# Patient Record
Sex: FEMALE | Race: WHITE | Employment: UNEMPLOYED | URBAN - METROPOLITAN AREA
[De-identification: names, ages, dates, MRNs, and addresses within clinical notes are randomized per-mention and may not be internally consistent; named-entity substitution may affect disease eponyms.]

---

## 2017-05-25 ENCOUNTER — DOCUMENTATION ONLY (OUTPATIENT)
Dept: FAMILY MEDICINE CLINIC | Age: 47
End: 2017-05-25

## 2017-05-25 ENCOUNTER — OFFICE VISIT (OUTPATIENT)
Dept: FAMILY MEDICINE CLINIC | Age: 47
End: 2017-05-25

## 2017-05-25 VITALS
SYSTOLIC BLOOD PRESSURE: 142 MMHG | RESPIRATION RATE: 20 BRPM | HEIGHT: 62 IN | BODY MASS INDEX: 43.39 KG/M2 | OXYGEN SATURATION: 96 % | TEMPERATURE: 98.3 F | DIASTOLIC BLOOD PRESSURE: 90 MMHG | HEART RATE: 93 BPM | WEIGHT: 235.8 LBS

## 2017-05-25 DIAGNOSIS — M67.40 GANGLION CYST: ICD-10-CM

## 2017-05-25 DIAGNOSIS — Z72.0 TOBACCO USE: ICD-10-CM

## 2017-05-25 DIAGNOSIS — R03.0 ELEVATED BP WITHOUT DIAGNOSIS OF HYPERTENSION: ICD-10-CM

## 2017-05-25 DIAGNOSIS — L98.9 SKIN LESION: Primary | ICD-10-CM

## 2017-05-25 DIAGNOSIS — R35.1 NOCTURIA: ICD-10-CM

## 2017-05-25 DIAGNOSIS — Z12.39 SCREENING FOR BREAST CANCER: ICD-10-CM

## 2017-05-25 DIAGNOSIS — M54.10 RADICULAR LOW BACK PAIN: ICD-10-CM

## 2017-05-25 DIAGNOSIS — R20.2 PARESTHESIA OF LEFT LEG: ICD-10-CM

## 2017-05-25 NOTE — MR AVS SNAPSHOT
Visit Information Date & Time Provider Department Dept. Phone Encounter #  
 5/25/2017 10:15 AM Danilo Traore MD [facility] [phone] [id number] Upcoming Health Maintenance Date Due DTaP/Tdap/Td series (1 - Tdap) 10/20/1989 PAP AKA CERVICAL CYTOLOGY 10/20/1989 INFLUENZA AGE 9 TO ADULT 8/1/2017 Allergies as of 5/25/2017  Review Complete On: 5/25/2017 By: Aura Raymond LPN Severity Noted Reaction Type Reactions Codeine High 05/25/2017    Anaphylaxis Pcn [Penicillins]  05/25/2017    Rash Current Immunizations  Never Reviewed No immunizations on file. Not reviewed this visit You Were Diagnosed With   
  
 Codes Comments Skin lesion    -  Primary ICD-10-CM: L98.9 ICD-9-CM: 709.9 Tobacco use     ICD-10-CM: Z72.0 ICD-9-CM: 305.1 Elevated BP without diagnosis of hypertension     ICD-10-CM: R03.0 ICD-9-CM: 796.2 Radicular low back pain     ICD-10-CM: M54.10 ICD-9-CM: 724.4 Screening for breast cancer     ICD-10-CM: Z12.39 
ICD-9-CM: V76.10 Paresthesia of left leg     ICD-10-CM: R20.2 ICD-9-CM: 782.0 Nocturia     ICD-10-CM: R35.1 ICD-9-CM: 788.43 Ganglion cyst     ICD-10-CM: M67.40 ICD-9-CM: 727.43 Vitals BP Pulse Temp Resp Height(growth percentile) Weight(growth percentile) 142/90 93 98.3 °F (36.8 °C) 20 5' 2\" (1.575 m) 235 lb 12.8 oz (107 kg) SpO2 BMI OB Status Smoking Status 96% 43.13 kg/m2 Postmenopausal Current Every Day Smoker Vitals History BMI and BSA Data Body Mass Index Body Surface Area  
 43.13 kg/m 2 2.16 m 2 Your Updated Medication List  
  
Notice  As of 5/25/2017 10:41 AM  
 You have not been prescribed any medications. We Performed the Following REFERRAL TO DERMATOLOGY [REF19 Custom] REFERRAL TO HAND SURGERY [REF31 Custom] Comments:  
 Please evaluate patient for ganglion cyst. To-Do List   
 05/25/2017 Lab:  CBC W/O DIFF   
  
 05/25/2017 Lab:  HEMOGLOBIN A1C W/O EAG   
  
 05/25/2017 Lab:  LIPID PANEL   
  
 05/25/2017 Imaging:  CAMPOS MAMMO BI SCREENING INCL CAD   
  
 05/25/2017 Lab:  METABOLIC PANEL, COMPREHENSIVE   
  
 05/25/2017 Neurology:  NCV/LAT SENSORY PER NERVE LW/LT   
  
 05/25/2017 Lab:  TSH 3RD GENERATION   
  
 05/25/2017 Lab:  VITAMIN B12   
  
 05/25/2017 Imaging:  XR SPINE LUMB 2 OR 3 V Referral Information Referral ID Referred By Referred To  
  
 [id number] [facility], Sally Child, [address] [facility], [address] Phone: [phone] Fax: [phone] Visits Status Start Date End Date 1 New Request 5/25/17 5/25/18 If your referral has a status of pending review or denied, additional information will be sent to support the outcome of this decision. Referral ID Referred By Referred To  
 [id number] Anoop Petersen V Not Available Visits Status Start Date End Date 1 New Request 5/25/17 5/25/18 If your referral has a status of pending review or denied, additional information will be sent to support the outcome of this decision. Patient Instructions You have been referred to gynecology. Please call one of the preferred providers listed below and schedule your appointment. Once you have scheduled your appointment, please call the office at [phone] and leave the details of your appointment (provider you will be seeing, appointment date and time) on the voice mail. [facility]2 [facility] Melvin Stark [address], [address] [facility], [address] [facility],   [address] 
phone: [phone] Audrey Jernigan [facility] [address], [address] [facility] [place] [facility], [address] 
[phone] Introducing \[facility]\"" & HEALTH SERVICES!    
 Meghann Stevensonverly introduces thinkingphones patient portal. Now you can access parts of your medical record, email your doctor's office, and request medication refills online. 1. In your internet browser, go to [email or web address]. [employer] 2. Click on the First Time User? Click Here link in the Sign In box. You will see the New Member Sign Up page. 3. Enter your Xiam Access Code exactly as it appears below. You will not need to use this code after youve completed the sign-up process. If you do not sign up before the expiration date, you must request a new code. · Xiam Access Code: [email or web address] Expires: 8/23/2017  9:58 AM 
 
4. Enter the last four digits of your Social Security Number (xxxx) and Date of Birth (mm/dd/yyyy) as indicated and click Submit. You will be taken to the next sign-up page. 5. Create a Xiam ID. This will be your Xiam login ID and cannot be changed, so think of one that is secure and easy to remember. 6. Create a Xiam password. You can change your password at any time. 7. Enter your Password Reset Question and Answer. This can be used at a later time if you forget your password. 8. Enter your e-mail address. You will receive e-mail notification when new information is available in [address] 9. Click Sign Up. You can now view and download portions of your medical record. 10. Click the Download Summary menu link to download a portable copy of your medical information. If you have questions, please visit the Frequently Asked Questions section of the Xiam website. Remember, Xiam is NOT to be used for urgent needs. For medical emergencies, dial 911. Now available from your iPhone and Android! Please provide this summary of care documentation to your next provider. Your primary care clinician is listed as Eliot 13. If you have any questions after today's visit, please call [phone].

## 2017-05-25 NOTE — PROGRESS NOTES
Patient NCV order faxed to [facility] neurology and sleep center     Phone [phone]  Fax [phone]     They will contact patient to make appointment

## 2017-05-25 NOTE — PATIENT INSTRUCTIONS
You have been referred to gynecology. Please call one of the preferred providers listed below and schedule your appointment. Once you have scheduled your appointment, please call the office at [phone] and leave the details of your appointment (provider you will be seeing, appointment date and time) on the voice mail.       Truman Bailey  [address], [address], [address] [facility] [address]  phone: [phone] [facility]  [address], [address] [facility], [address] [place] [address]  [phone]

## 2017-05-25 NOTE — PROGRESS NOTES
Assessment/Plan:    Job Hawk was seen today for establish care. Diagnoses and all orders for this visit:    Skin lesion- referral derm for eval  -     REFERRAL TO DERMATOLOGY    Elevated BP without diagnosis of hypertension- f/u in 1-3 mos. -     CBC W/O DIFF; Future  -     METABOLIC PANEL, COMPREHENSIVE; Future  -     LIPID PANEL; Future    Radicular low back pain with paresthesia of LLE. Ck xray. Suspect central etiology. Ck ncs. -     XR SPINE LUMB 2 OR 3 V; Future  -     NCV/LAT SENSORY PER NERVE LW/LT; Future  -     VITAMIN B12; Future  -     TSH 3RD GENERATION; Future    Screening for breast cancer  -     [facility] MAMMO BI SCREENING INCL CAD; Future    Tobacco use    Nocturia  -     HEMOGLOBIN A1C W/O EAG; Future    Ganglion cyst  -     REFERRAL TO HAND SURGERY        The plan was discussed with the patient. The patient verbalized understanding and is in agreement with the plan. All medication potential side effects were discussed with the patient. Health Maintenance: pap overdue- pt to schedule with gyn  Health Maintenance   Topic Date Due    DTaP/Tdap/Td series (1 - Tdap) 10/20/1989    PAP AKA CERVICAL CYTOLOGY  10/20/1989    INFLUENZA AGE 9 TO ADULT  08/01/2017       Bola Romo is a 48 y.o.  female and presents with No chief complaint on file. Subjective:  Pt is here to establish care. She notes a skin lesion on R breast x 5 years. Has gotten bigger over time. It itches. Her father had skin cancer (she is unsure of the type). Pt c/o bilat low back pain. In 2011, she fell when getting out of bed. She was told her \"platelet levels were off\". She was told it was possibly MS but didn't have further eval.  Pt c/o L leg numbness on anterior and lateral portion of leg. She denies weakness. Walking makes her back pain worse. She states she has difficulty walking and moving her L leg sometimes. Notes a lump on L wrist that is pain.   States it started as tendonitis in her thumb but has moved to her wrist x 1.5mos. Worse with extension of wrist.    ROS:  Constitutional: No recent weight change. No weakness/fatigue. No f/c. Skin: No rashes, change in nails/hair, itching   HENT: No HA, dizziness. No hearing loss/tinnitus. No nasal congestion/discharge. Eyes: No change in vision, double/blurred vision or eye pain/redness. Cardiovascular: No CP/palpitations. No LYLES/orthopnea/PND. Respiratory: No cough/sputum, dyspnea, wheezing. Gastointestinal: No dysphagia, reflux. No n/v. No constipation/diarrhea. No melena/rectal bleeding. Genitourinary: No dysuria, urinary hesitancy, +nocturia, hematuria. No incontinence. Musculoskeletal: No joint pain/stiffness. + muscle pain/tenderness. Endo: No heat/cold intolerance, + polyuria/polydypsia. Heme: No h/o anemia. No easy bleeding/bruising. Allergy/Immunology: No seasonal rhinitis. Denies frequent colds, sinus/ear infections. Neurological: No seizures/+numbness/no weakness. + paresthesias. Psychiatric:  No depression, anxiety. PMH:  Past Medical History:   Diagnosis Date    Anxiety     Depression        PSH:  History reviewed. No pertinent surgical history. SH:  Social History   Substance Use Topics    Smoking status: Current Every Day Smoker    Smokeless tobacco: Never Used    Alcohol use Yes       FH:  Family History   Problem Relation Age of Onset    Diabetes Mother     Diabetes Father     Cancer Father      skin,colon    Stroke Father     Heart Disease Father        Medications/Allergies:  No current outpatient prescriptions on file.   Allergies   Allergen Reactions    Codeine Anaphylaxis    Pcn [Penicillins] Rash       Objective:  Visit Vitals    /90    Pulse 93    Temp 98.3 °F (36.8 °C)    Resp 20    Ht 5' 2\" (1.575 m)    Wt 235 lb 12.8 oz (107 kg)    SpO2 96%    BMI 43.13 kg/m2      Constitutional: Well developed, nourished, no distress, alert, obese habitus   HENT: Exterior ears and tympanic membranes normal bilaterally. Supple neck. No thyromegaly or lymphadenopathy. Oropharynx clear and moist mucous membranes. Eyes: Conjunctiva normal. PERRL. Cardiovascular: S1, S2.  RRR. No murmurs/rubs. No thrills palpated. No carotid bruits. Intact distal pulses. No edema. Pulmonary/Chest Wall: No abnormalities on inspection. Clear to auscultation bilaterally. No wheezing/rhonchi. Normal effort. GI: Soft, nontender, nondistended. Normal active bowel sounds. No  masses on palpation. No hepatosplenomegaly. Musculoskeletal: Gait normal.  Joints without deformity/tenderness.  +ganglion cyst L wrist   Neurological: Appropriate. No focal motor deficits. Speech normal.  Decreased sensation to monofilament to LLE and L back. Skin: 3-4cm large brown rough plaque lesion on R breast    Psych: Appropriate affect, judgement and insight. Short-term memory intact.

## 2017-05-25 NOTE — PROGRESS NOTES
Hugo Perez is a 48 y.o. female here today to establish care. Patient also has complaints of right breast mole. 1. Have you been to the ER, urgent care clinic since your last visit? Hospitalized since your last visit? No    2. Have you seen or consulted any other health care providers outside of the [address] [place] [address] since your last visit? Include any pap smears or colon screening.  No

## 2017-05-26 ENCOUNTER — TELEPHONE (OUTPATIENT)
Dept: FAMILY MEDICINE CLINIC | Age: 47
End: 2017-05-26

## 2017-05-26 NOTE — TELEPHONE ENCOUNTER
No, I ordered a nerve conduction study which will be done at neurology office.   We will contact her with details

## 2017-06-02 ENCOUNTER — TELEPHONE (OUTPATIENT)
Dept: FAMILY MEDICINE CLINIC | Age: 47
End: 2017-06-02

## 2017-06-02 NOTE — TELEPHONE ENCOUNTER
Pt stated that she was told by Hand Surgery specialist that they do not accept her health insurance [employer]. Pt wants to see if Dr. Nai Chacko can refer her somewhere else. Asking to be called back.

## 2017-06-06 ENCOUNTER — HOSPITAL ENCOUNTER (OUTPATIENT)
Dept: LAB | Age: 47
Discharge: HOME | End: 2017-06-06

## 2017-06-06 PROCEDURE — 99001 SPECIMEN HANDLING PT-LAB: CPT | Performed by: INTERNAL MEDICINE

## 2017-06-07 ENCOUNTER — TELEPHONE (OUTPATIENT)
Dept: FAMILY MEDICINE CLINIC | Age: 47
End: 2017-06-07

## 2017-06-07 PROBLEM — R73.03 PREDIABETES: Status: ACTIVE | Noted: 2017-06-07

## 2017-06-07 PROBLEM — E78.2 MIXED HYPERCHOLESTEROLEMIA AND HYPERTRIGLYCERIDEMIA: Status: ACTIVE | Noted: 2017-06-07

## 2017-06-07 PROBLEM — E78.6 LOW HDL (UNDER 40): Status: ACTIVE | Noted: 2017-06-07

## 2017-06-07 LAB
ALBUMIN SERPL-MCNC: 4.4 G/DL (ref 3.5–5.5)
ALBUMIN/GLOB SERPL: 1.9 {RATIO} (ref 1.2–2.2)
ALP SERPL-CCNC: 83 IU/L (ref 39–117)
ALT SERPL-CCNC: 38 IU/L (ref 0–32)
AST SERPL-CCNC: 20 IU/L (ref 0–40)
BILIRUB SERPL-MCNC: 0.2 MG/DL (ref 0–1.2)
BUN SERPL-MCNC: 13 MG/DL (ref 6–24)
BUN/CREAT SERPL: 13 (ref 9–23)
CALCIUM SERPL-MCNC: 9.2 MG/DL (ref 8.7–10.2)
CHLORIDE SERPL-SCNC: 101 MMOL/L (ref 96–106)
CHOLEST SERPL-MCNC: 200 MG/DL (ref 100–199)
CO2 SERPL-SCNC: 21 MMOL/L (ref 18–29)
CREAT SERPL-MCNC: 0.97 MG/DL (ref 0.57–1)
ERYTHROCYTE [DISTWIDTH] IN BLOOD BY AUTOMATED COUNT: 14.9 % (ref 12.3–15.4)
GLOBULIN SER CALC-MCNC: 2.3 G/DL (ref 1.5–4.5)
GLUCOSE SERPL-MCNC: 100 MG/DL (ref 65–99)
HBA1C MFR BLD: 6.3 % (ref 4.8–5.6)
HCT VFR BLD AUTO: 43.8 % (ref 34–46.6)
HDLC SERPL-MCNC: 38 MG/DL
HGB BLD-MCNC: 14.5 G/DL (ref 11.1–15.9)
INTERPRETATION, 910389: NORMAL
LDLC SERPL CALC-MCNC: 103 MG/DL (ref 0–99)
MCH RBC QN AUTO: 30.5 PG (ref 26.6–33)
MCHC RBC AUTO-ENTMCNC: 33.1 G/DL (ref 31.5–35.7)
MCV RBC AUTO: 92 FL (ref 79–97)
PLATELET # BLD AUTO: 267 X10E3/UL (ref 150–379)
POTASSIUM SERPL-SCNC: 5.1 MMOL/L (ref 3.5–5.2)
PROT SERPL-MCNC: 6.7 G/DL (ref 6–8.5)
RBC # BLD AUTO: 4.76 X10E6/UL (ref 3.77–5.28)
SODIUM SERPL-SCNC: 140 MMOL/L (ref 134–144)
TRIGL SERPL-MCNC: 293 MG/DL (ref 0–149)
TSH SERPL DL<=0.005 MIU/L-ACNC: 2.18 UIU/ML (ref 0.45–4.5)
VIT B12 SERPL-MCNC: 310 PG/ML (ref 211–946)
VLDLC SERPL CALC-MCNC: 59 MG/DL (ref 5–40)
WBC # BLD AUTO: 8.6 X10E3/UL (ref 3.4–10.8)

## 2017-06-07 NOTE — TELEPHONE ENCOUNTER
Pt called in to report that she is scheduled for an appointment with dermatology tomorrow. Just wanted to let her PCP know.

## 2017-06-07 NOTE — PROGRESS NOTES
Tell pt her labs showed her triglycerides are high and her good cholesterol is low. In addition, it showed that she is prediabetic. She should cut back on carbs/sweets/soda/juice and increase her exercise (to help the HDL). Let's repeat her labs in 3 months.

## 2017-06-27 ENCOUNTER — TELEPHONE (OUTPATIENT)
Dept: FAMILY MEDICINE CLINIC | Age: 47
End: 2017-06-27

## 2017-06-27 NOTE — TELEPHONE ENCOUNTER
Pt has a cpe scheduled 7/31 and is requesting her bw ordered before her appt.  Please review and order accordingly

## 2017-06-28 DIAGNOSIS — E78.2 MIXED HYPERCHOLESTEROLEMIA AND HYPERTRIGLYCERIDEMIA: Primary | ICD-10-CM

## 2017-06-28 DIAGNOSIS — E78.2 MIXED HYPERCHOLESTEROLEMIA AND HYPERTRIGLYCERIDEMIA: ICD-10-CM

## 2017-06-28 DIAGNOSIS — Z00.00 ROUTINE GENERAL MEDICAL EXAMINATION AT A HEALTH CARE FACILITY: ICD-10-CM

## 2017-06-28 DIAGNOSIS — R73.03 PREDIABETES: ICD-10-CM

## 2017-06-28 DIAGNOSIS — R03.0 ELEVATED BP WITHOUT DIAGNOSIS OF HYPERTENSION: ICD-10-CM

## 2017-08-15 PROBLEM — M79.7 FIBROMYALGIA: Status: ACTIVE | Noted: 2017-08-15

## 2017-10-24 ENCOUNTER — HOSPITAL ENCOUNTER (OUTPATIENT)
Dept: LAB | Age: 47
Discharge: HOME | End: 2017-10-24

## 2017-10-24 ENCOUNTER — OFFICE VISIT (OUTPATIENT)
Dept: FAMILY MEDICINE CLINIC | Age: 47
End: 2017-10-24

## 2017-10-24 VITALS
RESPIRATION RATE: 19 BRPM | SYSTOLIC BLOOD PRESSURE: 140 MMHG | HEART RATE: 69 BPM | DIASTOLIC BLOOD PRESSURE: 84 MMHG | OXYGEN SATURATION: 97 % | TEMPERATURE: 97.7 F | HEIGHT: 62 IN | WEIGHT: 234 LBS | BODY MASS INDEX: 43.06 KG/M2

## 2017-10-24 DIAGNOSIS — I10 ESSENTIAL HYPERTENSION: ICD-10-CM

## 2017-10-24 DIAGNOSIS — F32.1 MODERATE SINGLE CURRENT EPISODE OF MAJOR DEPRESSIVE DISORDER (HCC): ICD-10-CM

## 2017-10-24 DIAGNOSIS — Z72.0 TOBACCO USE: ICD-10-CM

## 2017-10-24 DIAGNOSIS — M79.7 FIBROMYALGIA: ICD-10-CM

## 2017-10-24 DIAGNOSIS — Z00.00 ROUTINE GENERAL MEDICAL EXAMINATION AT A HEALTH CARE FACILITY: Primary | ICD-10-CM

## 2017-10-24 DIAGNOSIS — R73.03 PREDIABETES: ICD-10-CM

## 2017-10-24 DIAGNOSIS — Z23 ENCOUNTER FOR IMMUNIZATION: ICD-10-CM

## 2017-10-24 DIAGNOSIS — E78.2 MIXED HYPERCHOLESTEROLEMIA AND HYPERTRIGLYCERIDEMIA: ICD-10-CM

## 2017-10-24 DIAGNOSIS — M54.10 RADICULAR LOW BACK PAIN: ICD-10-CM

## 2017-10-24 DIAGNOSIS — M67.439 GANGLION OF WRIST, UNSPECIFIED LATERALITY: ICD-10-CM

## 2017-10-24 PROBLEM — R03.0 ELEVATED BP WITHOUT DIAGNOSIS OF HYPERTENSION: Status: RESOLVED | Noted: 2017-05-25 | Resolved: 2017-10-24

## 2017-10-24 PROCEDURE — 99001 SPECIMEN HANDLING PT-LAB: CPT | Performed by: INTERNAL MEDICINE

## 2017-10-24 RX ORDER — GABAPENTIN 300 MG/1
300 CAPSULE ORAL 3 TIMES DAILY
COMMUNITY
Start: 2017-10-20 | End: 2018-01-24

## 2017-10-24 RX ORDER — MELOXICAM 15 MG/1
1 TABLET ORAL DAILY
COMMUNITY
Start: 2017-10-20 | End: 2018-01-24

## 2017-10-24 NOTE — PATIENT INSTRUCTIONS
Stopping Smoking: Care Instructions  Your Care Instructions  Cigarette smokers crave the nicotine in cigarettes. Giving it up is much harder than simply changing a habit. Your body has to stop craving the nicotine. It is hard to quit, but you can do it. There are many tools that people use to quit smoking. You may find that combining tools works best for you. There are several steps to quitting. First you get ready to quit. Then you get support to help you. After that, you learn new skills and behaviors to become a nonsmoker. For many people, a necessary step is getting and using medicine. Your doctor will help you set up the plan that best meets your needs. You may want to attend a smoking cessation program to help you quit smoking. When you choose a program, look for one that has proven success. Ask your doctor for ideas. You will greatly increase your chances of success if you take medicine as well as get counseling or join a cessation program.  Some of the changes you feel when you first quit tobacco are uncomfortable. Your body will miss the nicotine at first, and you may feel short-tempered and grumpy. You may have trouble sleeping or concentrating. Medicine can help you deal with these symptoms. You may struggle with changing your smoking habits and rituals. The last step is the tricky one: Be prepared for the smoking urge to continue for a time. This is a lot to deal with, but keep at it. You will feel better. Follow-up care is a key part of your treatment and safety. Be sure to make and go to all appointments, and call your doctor if you are having problems. Its also a good idea to know your test results and keep a list of the medicines you take. How can you care for yourself at home? · Ask your family, friends, and coworkers for support. You have a better chance of quitting if you have help and support.   · Join a support group, such as Nicotine Anonymous, for people who are trying to quit smoking. · Consider signing up for a smoking cessation program, such as the American Lung Association's Freedom from Smoking program.  · Set a quit date. Pick your date carefully so that it is not right in the middle of a big deadline or stressful time. Once you quit, do not even take a puff. Get rid of all ashtrays and lighters after your last cigarette. Clean your house and your clothes so that they do not smell of smoke. · Learn how to be a nonsmoker. Think about ways you can avoid those things that make you reach for a cigarette. ¨ Avoid situations that put you at greatest risk for smoking. For some people, it is hard to have a drink with friends without smoking. For others, they might skip a coffee break with coworkers who smoke. ¨ Change your daily routine. Take a different route to work or eat a meal in a different place. · Cut down on stress. Calm yourself or release tension by doing an activity you enjoy, such as reading a book, taking a hot bath, or gardening. · Talk to your doctor or pharmacist about nicotine replacement therapy, which replaces the nicotine in your body. You still get nicotine but you do not use tobacco. Nicotine replacement products help you slowly reduce the amount of nicotine you need. These products come in several forms, many of them available over-the-counter:  ¨ Nicotine patches  ¨ Nicotine gum and lozenges  ¨ Nicotine inhaler  · Ask your doctor about bupropion (Wellbutrin) or varenicline (Chantix), which are prescription medicines. They do not contain nicotine. They help you by reducing withdrawal symptoms, such as stress and anxiety. · Some people find hypnosis, acupuncture, and massage helpful for ending the smoking habit. · Eat a healthy diet and get regular exercise. Having healthy habits will help your body move past its craving for nicotine. · Be prepared to keep trying. Most people are not successful the first few times they try to quit.  Do not get mad at yourself if you smoke again. Make a list of things you learned and think about when you want to try again, such as next week, next month, or next year. Where can you learn more? Go to http://andrews-amanda.info/. Enter O024 in the search box to learn more about \"Stopping Smoking: Care Instructions. \"  Current as of: March 20, 2017  Content Version: 11.3  © 4014-7697 [employer]. Care instructions adapted under license by [employer] (which disclaims liability or warranty for this information). If you have questions about a medical condition or this instruction, always ask your healthcare professional. [place] [address] any warranty or liability for your use of this information. Make an appointment with:     [facility]  [address], [address] [place] [address]  [phone]        You have been referred to gynecology. Please call one of the preferred providers listed below and schedule your appointment. Once you have scheduled your appointment, please call the office at [phone] and leave the details of your appointment (provider you will be seeing, appointment date and time) on the voice mail.       Krystle Burkett  [facility] [address], [address] [facility], [address] [place] [address],   [address]  phone: [phone]

## 2017-10-24 NOTE — PROGRESS NOTES
Assessment/Plan:    1. Routine general medical examination at a health care facility    2. Essential hypertension  -pt wishes to work on diet/wt loss. F/u in1 mo    3. Prediabetes  -ck labs    4. Mixed hypercholesterolemia and hypertriglyceridemia  -monitor    5. Tobacco use  -counseled on cessation    6. Ganglion of wrist, unspecified laterality  -Dr. Anthony Correia    7. Fibromyalgia- consider cymbalta to help with sx and depression  - REFERRAL TO PHYSICAL THERAPY    8. Radicular low back pain  -start neurontin as prescribed by Dr. Anthony Correia  - Tae Townsend    9. Depression - declines meds. Pt to go to counseling. Discussed she may need meds to help with sx, given severity of sx.    10. Encounter for immunization  - Influenza virus vaccine (QUADRIVALENT PRES FREE SYRINGE) IM ([id number])  - MO IMMUNIZ ADMIN,1 SINGLE/COMB VAC/TOXOID    The plan was discussed with the patient. The patient verbalized understanding and is in agreement with the plan. All medication potential side effects were discussed with the patient. Health Maintenance:   Health Maintenance   Topic Date Due    Pneumococcal 19-64 Medium Risk (1 of 1 - PPSV23) 10/20/1987    DTaP/Tdap/Td series (1 - Tdap) 10/20/1989    PAP AKA CERVICAL CYTOLOGY  10/20/1989    INFLUENZA AGE 9 TO ADULT  08/01/2017       Ferrell Soulier is a 50 y.o. female and presents with Complete Physical     Subjective:  Here for physical. Had labs drawn this am.   HTN - bp consistently high on past two visits. She doesn't want to start meds. She wishes to watch it for one more month. She has h/o depression, dx 2005. She states she was never treated (she previously self-treated with drugs and ETOH). She feels overwhelmed. PHQ score correlates to mod-severe depression. She doesn't want to take any medications. She was prescribed neurontin by Dr. Anthony Correia to help with fibromyalgia sx. She hasn't started it yet b/c she is concerned about possible side effects. ROS:  Constitutional: No recent weight change. No weakness/fatigue. No f/c. Skin: No rashes, change in nails/hair, itching   HENT: No HA, dizziness. No hearing loss/tinnitus. No nasal congestion/discharge. Eyes: No change in vision, double/blurred vision or eye pain/redness. Cardiovascular: No CP/palpitations. No LYLES/orthopnea/PND. Respiratory: No cough/sputum, dyspnea, wheezing. Gastointestinal: No dysphagia, reflux. No n/v. No constipation/diarrhea. No melena/rectal bleeding. Genitourinary: No dysuria, urinary hesitancy, nocturia, hematuria. No incontinence. Musculoskeletal: + joint pain/stiffness. + muscle pain/tenderness. Endo: No heat/cold intolerance, no polyuria/polydypsia. Heme: No h/o anemia. No easy bleeding/bruising. Allergy/Immunology: No seasonal rhinitis. Denies frequent colds, sinus/ear infections. Neurological: No seizures/numbness/weakness. No paresthesias. Psychiatric:  + depression, no anxiety. The problem list was updated as a part of today's visit. Patient Active Problem List   Diagnosis Code    Elevated BP without diagnosis of hypertension R03.0    Tobacco use Z72.0    Radicular low back pain M54.10    Prediabetes R73.03    Low HDL (under 40) E78.6    Mixed hypercholesterolemia and hypertriglyceridemia E78.2    Fibromyalgia M79.7       The PSH, FH were reviewed. SH:  Social History   Substance Use Topics    Smoking status: Current Every Day Smoker    Smokeless tobacco: Never Used    Alcohol use Yes       Medications/Allergies:    Current Outpatient Prescriptions:     meloxicam (MOBIC) 15 mg tablet, Take 1 Tab by mouth daily. , Disp: , Rfl:     gabapentin (NEURONTIN) 300 mg capsule, Take 300 mg by mouth three (3) times daily. , Disp: , Rfl:        Allergies   Allergen Reactions    Codeine Anaphylaxis    Pcn [Penicillins] Rash       Objective:  Visit Vitals    /84 (BP 1 Location: Left arm, BP Patient Position: Sitting)    Pulse 69  Temp 97.7 °F (36.5 °C) (Oral)    Resp 19    Ht 5' 1.67\" (1.566 m)    Wt 234 lb (106.1 kg)    SpO2 97%    BMI 43.26 kg/m2      Constitutional: Well developed, nourished, no distress, alert, obese habitus   HENT: Exterior ears and tympanic membranes normal bilaterally. Supple neck. No thyromegaly or lymphadenopathy. Oropharynx clear and moist mucous membranes. Eyes: Conjunctiva normal. PERRL. CV: S1, S2.  RRR. No murmurs/rubs. No thrills palpated. No carotid bruits. Intact distal pulses. No edema. Pulm: No abnormalities on inspection. Clear to auscultation bilaterally. No wheezing/rhonchi. Normal effort. GI: Soft, nontender, nondistended. Normal active bowel sounds. MS: Gait normal.  Joints without deformity/tenderness. Strength intact bilateral upper and lower ext. Normal ROM all extremities. Neuro: A/O x 3. No focal motor or sensory deficits. Speech normal.   Skin: No lesions/rashes on inspection. Psych: Appropriate affect, judgement and insight. Short-term memory intact.        PHQ over the last two weeks 10/24/2017   PHQ Not Done -   Little interest or pleasure in doing things More than half the days   Feeling down, depressed or hopeless More than half the days   Total Score PHQ 2 4   Trouble falling or staying asleep, or sleeping too much Nearly every day   Feeling tired or having little energy Nearly every day   Poor appetite or overeating Nearly every day   Feeling bad about yourself - or that you are a failure or have let yourself or your family down Not at all   Trouble concentrating on things such as school, work, reading or watching TV Nearly every day   Moving or speaking so slowly that other people could have noticed; or the opposite being so fidgety that others notice Not at all   Thoughts of being better off dead, or hurting yourself in some way Not at all   PHQ 9 Score 16

## 2017-10-24 NOTE — PROGRESS NOTES
Julia Padilla is a 50 y.o. female (: 1970) presenting to address:    Chief Complaint   Patient presents with    Complete Physical       Vitals:    10/24/17 0810   BP: 140/84   Pulse: 69   Resp: 19   Temp: 97.7 °F (36.5 °C)   TempSrc: Oral   SpO2: 97%   Weight: 234 lb (106.1 kg)   Height: 5' 1.67\" (1.566 m)   PainSc:   7   PainLoc: Back       Hearing/Vision:      Visual Acuity Screening    Right eye Left eye Both eyes   Without correction: 20/20 20/40 20/20   With correction:          Learning Assessment:   No flowsheet data found. Depression Screening:     PHQ over the last two weeks 2017   PHQ Not Done Active Diagnosis of Depression or Bipolar Disorder     Fall Risk Assessment:   No flowsheet data found. Abuse Screening:   No flowsheet data found. Coordination of Care Questionaire:   1. Have you been to the ER, urgent care clinic since your last visit? Hospitalized since your last visit? No     2. Have you seen or consulted any other health care providers outside of the [address] [place] [address] since your last visit? Include any pap smears or colon screening. Dr Shay rod for hand, Dr. Murray Owens neuro 2017 Dr. Fabian James     Advanced Directive:   1. Do you have an Advanced Directive? No     2. Would you like information on Advanced Directives?  Yes       Needs PHQ9 completed

## 2017-10-24 NOTE — PROGRESS NOTES
Flu shot Immunization/s administered 10/24/2017 by Yeimy Shay LPN with guardian's consent. Patient tolerated procedure well. No reactions noted.

## 2017-10-25 LAB
ALBUMIN SERPL-MCNC: 4.2 G/DL (ref 3.5–5.5)
ALBUMIN/GLOB SERPL: 1.6 {RATIO} (ref 1.2–2.2)
ALP SERPL-CCNC: 83 IU/L (ref 39–117)
ALT SERPL-CCNC: 41 IU/L (ref 0–32)
AST SERPL-CCNC: 16 IU/L (ref 0–40)
BILIRUB SERPL-MCNC: <0.2 MG/DL (ref 0–1.2)
BUN SERPL-MCNC: 13 MG/DL (ref 6–24)
BUN/CREAT SERPL: 11 (ref 9–23)
CALCIUM SERPL-MCNC: 9.1 MG/DL (ref 8.7–10.2)
CHLORIDE SERPL-SCNC: 99 MMOL/L (ref 96–106)
CHOLEST SERPL-MCNC: 199 MG/DL (ref 100–199)
CO2 SERPL-SCNC: 22 MMOL/L (ref 18–29)
CREAT SERPL-MCNC: 1.14 MG/DL (ref 0.57–1)
ERYTHROCYTE [DISTWIDTH] IN BLOOD BY AUTOMATED COUNT: 14.6 % (ref 12.3–15.4)
GFR SERPLBLD CREATININE-BSD FMLA CKD-EPI: 57 ML/MIN/1.73
GFR SERPLBLD CREATININE-BSD FMLA CKD-EPI: 65 ML/MIN/1.73
GLOBULIN SER CALC-MCNC: 2.6 G/DL (ref 1.5–4.5)
GLUCOSE SERPL-MCNC: 87 MG/DL (ref 65–99)
HBA1C MFR BLD: 6.1 % (ref 4.8–5.6)
HCT VFR BLD AUTO: 42.9 % (ref 34–46.6)
HDLC SERPL-MCNC: 37 MG/DL
HGB BLD-MCNC: 14.3 G/DL (ref 11.1–15.9)
INTERPRETATION, 910389: NORMAL
INTERPRETATION: NORMAL
LDLC SERPL CALC-MCNC: 114 MG/DL (ref 0–99)
MCH RBC QN AUTO: 30.7 PG (ref 26.6–33)
MCHC RBC AUTO-ENTMCNC: 33.3 G/DL (ref 31.5–35.7)
MCV RBC AUTO: 92 FL (ref 79–97)
PDF IMAGE, 910387: NORMAL
PLATELET # BLD AUTO: 283 X10E3/UL (ref 150–379)
POTASSIUM SERPL-SCNC: 3.9 MMOL/L (ref 3.5–5.2)
PROT SERPL-MCNC: 6.8 G/DL (ref 6–8.5)
RBC # BLD AUTO: 4.66 X10E6/UL (ref 3.77–5.28)
SODIUM SERPL-SCNC: 139 MMOL/L (ref 134–144)
TRIGL SERPL-MCNC: 239 MG/DL (ref 0–149)
VLDLC SERPL CALC-MCNC: 48 MG/DL (ref 5–40)
WBC # BLD AUTO: 10.8 X10E3/UL (ref 3.4–10.8)

## 2017-10-27 ENCOUNTER — TELEPHONE (OUTPATIENT)
Dept: FAMILY MEDICINE CLINIC | Age: 47
End: 2017-10-27

## 2017-10-27 NOTE — TELEPHONE ENCOUNTER
Patient called and l/m to let Dr. Jose D Lerner know that she has appt.  With Dr. Forest Mireles 11/6/17 @1 pm

## 2017-11-01 ENCOUNTER — HOSPITAL ENCOUNTER (OUTPATIENT)
Dept: PHYSICAL THERAPY | Age: 47
Discharge: HOME | End: 2017-11-01
Payer: MEDICAID

## 2017-11-01 PROCEDURE — 97162 PT EVAL MOD COMPLEX 30 MIN: CPT

## 2017-11-01 PROCEDURE — 97110 THERAPEUTIC EXERCISES: CPT

## 2017-11-01 NOTE — PROGRESS NOTES
Shaylee Duncan 31  [facility]   [place] [address], [place] [address],[facility], [address] - Phone: [phone]  Fax: [phone] / [phone] [facility]  Patient Name: Sahra Leon : 1970   Medical   Diagnosis: Low back pain [M54.5]  Fibromyalgia [M79.7] Treatment Diagnosis: Low back pain [M54.5]  Fibromyalgia [M79.7]   Onset Date: Chronic ~      Referral Source: Sully Roberson MD [facility]): 2017   Prior Hospitalization: See medical history Provider #: [id number]   Prior Level of Function: Chronic occasional limitations to household chores, lifting, carrying and ADL's    Comorbidities: HTN, ? M.S., morbid obesity (BMI 42.8), fibromyalgia, asthma, depression   Medications: Verified on Patient Summary List   The Plan of Care and following information is based on the information from the initial evaluation.   ===========================================================================================  Assessment / key information:  Pt is a 50y.o. year old female with subjective complaints of LBP and left leg pain of chronic nature. Pt provides subjective history including: Pt states she fell in  and went to Urgent care and was told to f/u with neurology for possible MS, however pt has not pursued. 2017 dx by orthosurgeon for fibromyalgia. Pt went to neurologist 2017; per pt EMG testing completed and positive for impingement L5-S1 and referred for outpt PT and MRI, however she chose not to pursue either at that time. She recently f/u with PCP and was again referred for outpt PT to address LBP and fibromyalgia dx. Current pain is rated as 3 to 10/10; reports constant left leg numbness/burning/tingling from posterior back/buttocks, wrapping around anterior thigh and terminating at knee level.   Current functional limitations: standing tolerance ~ 5 minutes, walking tolerance ~5 minutes, prolonged sitting ~ 20 minutes, unable to sleep through the night. FOTO score= 47/100 indicating moderate impairment to functional activities. Today's evaulation is significant for Lumbar AROM grossly WNL with pain to left SB. Impaired core/ hip strength: glutes= 3/5, Glute Med= 3+/5, Hams (L) 4-, Quads (L) 4, Hip flex (L) 4+. Impaired flexibility to bilateral psoas and lumbo-sacral paraspinals. Positive special testing for (L) MILTON. (-) SLR, SLUMP, Gaenslen's. Pt with significant hypertonicity to bilateral lumbo-sacral paraspinals with TTP. Repeated movement testing reveals no directional preference. Pt will be a good candidate for skilled PT to address these impairments and promote return to normal ADLs and functional mobility for improved quality of life.    ===========================================================================================  Eval Complexity: History HIGH Complexity :3+ comorbidities / personal factors will impact the outcome/ POC ;  Examination  MEDIUM Complexity : 3 Standardized tests and measures addressing body structure, function, activity limitation and / or participation in recreation ; Presentation MEDIUM Complexity : Evolving with changing characteristics ;   Decision Making MEDIUM Complexity : FOTO score of 26-74; Overall Complexity MEDIUM  Problem List: pain affecting function, decrease strength, impaired gait/ balance, decrease ADL/ functional abilitiies, decrease activity tolerance, decrease flexibility/ joint mobility and decrease transfer abilities   Treatment Plan may include any combination of the following: Therapeutic exercise, Therapeutic activities, Neuromuscular re-education, Physical agent/modality, Gait/balance training, Manual therapy, Patient education and Functional mobility training  Patient / Family readiness to learn indicated by: asking questions and trying to perform skills  Persons(s) to be included in education: patient (P)  Barriers to Learning/Limitations: None  Measures taken:    Patient Goal (s): \"less pain, more mobility\"   Patient self reported health status: poor  Rehabilitation Potential: good   Short Term Goals: To be accomplished in  2  weeks:  1. Pt will be educated in appropriate HEP to improve LE strength/stability for amb, standing, and transfers. 2. Patient will report at least 50% functional improvement with standing, walking ADL's.  3. Pt will report at least 50% improvement in frequency/intensity of left LE radicular symptoms with ADL's.  Long Term Goals: To be accomplished in  4-6  weeks:  1. Pt will improve FOTO score to >/= 57 to demo a significant improvement in functional activity tolerance. 2. Pt will achieve >/= +4 GROC in order to promote increased activity tolerance. 3. Pt will note pain less than or equal to 3/10 at worst to allow increase in functional abilities. 4. Pt will increase bilateral glute strength to >/= 4-/5 to improve axial stability with standing/walking. Frequency / Duration:   Patient to be seen  2  times per week for 4-6  weeks:  Patient / Caregiver education and instruction: activity modification and exercises  G-Codes (GP): n/a  Therapist Signature: Chandana Ibarra PT Date: 71/5/0535   Certification Period: n/a Time: 12:52 PM   ===========================================================================================  I certify that the above Physical Therapy Services are being furnished while the patient is under my care. I agree with the treatment plan and certify that this therapy is necessary. Physician Signature:        Date:       Time:     Please sign and return to [facility] at [facility], [facility]. or you may fax the signed copy to [phone]. Thank you.

## 2017-11-01 NOTE — PROGRESS NOTES
PHYSICAL THERAPY - DAILY TREATMENT NOTE    Patient Name: Ravinder Winters        Date: 2017  : 1970   yes Patient  Verified  Visit #:   1     Insurance: Payor: Lalo Kay / Plan: Valri Pulling / Product Type: HMO /      In time: 102 Out time: 157   Total Treatment Time: 55     Medicare Time Tracking (below)   Total Timed Codes (min):  n/a 1:1 Treatment Time:  n/a     TREATMENT AREA =  Low back pain [M54.5]  Fibromyalgia [M79.7]    SUBJECTIVE  Pain Level (on 0 to 10 scale):  7-8  / 10   Medication Changes/New allergies or changes in medical history, any new surgeries or procedures?    no  If yes, update Summary List   Subjective Functional Status/Changes:  []  No changes reported     See POC          OBJECTIVE      See exam on chart for details on objective findings         10 min Therapeutic Exercise:  [x]  See flow sheet and pt ed below   Rationale:      increase ROM and increase strength to improve the patients ability to perform functional mobility/ADLs and attain goals. min Patient Education:  yes  Review HEP, handout created and issued to pt. as per chart. Pt educated in spinal anatomy, function and dysfunction as related to diagnosis. Instructed in proper body mechanics for supine to/from sit and correct execution of engaging core. Pt ed on activity modification to avoid over-exertion   []  Progressed/Changed HEP based on: Other Objective/Functional Measures:    See POC     Post Treatment Pain Level (on 0 to 10) scale:   7-8   10     ASSESSMENT  Assessment/Changes in Function:     See POC     []  See Progress Note/Recertification   Patient will continue to benefit from skilled PT services to modify and progress therapeutic interventions, address functional mobility deficits, address ROM deficits, address strength deficits, analyze and address soft tissue restrictions and analyze and cue movement patterns to attain remaining goals.    Progress toward goals / Updated goals:    See POC     PLAN  []  Upgrade activities as tolerated yes Continue plan of care   []  Discharge due to :    []  Other:      Therapist: Dante Vivas. Hans Corral PT    Date: 11/1/2017 Time: 12:51 PM     Future Appointments  Date Time Provider Jessica Reyez   11/1/2017 1:00 PM [facility]   11/6/2017 1:00 PM DO VICK Faye [facility]   11/28/2017 1:45 PM Zack Christina MD [facility]

## 2017-11-06 ENCOUNTER — OFFICE VISIT (OUTPATIENT)
Dept: OBGYN CLINIC | Age: 47
End: 2017-11-06

## 2017-11-06 VITALS
HEART RATE: 88 BPM | SYSTOLIC BLOOD PRESSURE: 143 MMHG | DIASTOLIC BLOOD PRESSURE: 82 MMHG | BODY MASS INDEX: 42.88 KG/M2 | HEIGHT: 62 IN | WEIGHT: 233 LBS

## 2017-11-06 DIAGNOSIS — Z01.419 WELL WOMAN EXAM WITH ROUTINE GYNECOLOGICAL EXAM: Primary | ICD-10-CM

## 2017-11-06 DIAGNOSIS — Z01.419 WELL WOMAN EXAM WITH ROUTINE GYNECOLOGICAL EXAM: ICD-10-CM

## 2017-11-06 DIAGNOSIS — R35.0 FREQUENT URINATION: ICD-10-CM

## 2017-11-06 DIAGNOSIS — R31.29 OTHER MICROSCOPIC HEMATURIA: ICD-10-CM

## 2017-11-06 LAB
BILIRUB UR QL STRIP: NEGATIVE
GLUCOSE UR-MCNC: NEGATIVE MG/DL
KETONES P FAST UR STRIP-MCNC: NEGATIVE MG/DL
PH UR STRIP: 5.5 [PH] (ref 4.6–8)
PROT UR QL STRIP: NORMAL
SP GR UR STRIP: 1.02 (ref 1–1.03)
UA UROBILINOGEN AMB POC: NORMAL (ref 0.2–1)
URINALYSIS CLARITY POC: CLEAR
URINALYSIS COLOR POC: YELLOW
URINE BLOOD POC: NORMAL
URINE LEUKOCYTES POC: NEGATIVE
URINE NITRITES POC: NEGATIVE

## 2017-11-06 NOTE — MR AVS SNAPSHOT
Visit Information Date & Time Provider Department Dept. Phone Encounter #  
 11/6/2017  1:00 PM Marylene Golds, Khadar E Ruthie Omalley OBGYN [phone] [id number] Your Appointments 11/28/2017  1:45 PM  
Follow Up with Hugh Craig MD  
3 [facility]) Appt Note: 1 month f/u  
 [address] [facility] [address] [facility] [id number] [phone] [address] [facility] [address] [facility] Upcoming Health Maintenance Date Due Pneumococcal 19-64 Medium Risk (1 of 1 - PPSV23) 10/20/1987 DTaP/Tdap/Td series (1 - Tdap) 10/20/1989 PAP AKA CERVICAL CYTOLOGY 10/20/1989 Allergies as of 11/6/2017  Review Complete On: 11/6/2017 By: Marylene Golds, DO Severity Noted Reaction Type Reactions Codeine High 05/25/2017    Anaphylaxis Pcn [Penicillins]  05/25/2017    Rash Current Immunizations  Never Reviewed Name Date Influenza Vaccine (Quad) PF 10/24/2017  8:44 AM  
  
 Not reviewed this visit You Were Diagnosed With   
  
 Codes Comments Well woman exam with routine gynecological exam    -  Primary ICD-10-CM: T42.228 ICD-9-CM: V72.31 Vitals BP Pulse Height(growth percentile) Weight(growth percentile) BMI OB Status 143/82 88 5' 2\" (1.575 m) 233 lb (105.7 kg) 42.62 kg/m2 Postmenopausal  
 Smoking Status Current Every Day Smoker Vitals History BMI and BSA Data Body Mass Index Body Surface Area  
 42.62 kg/m 2 2.15 m 2 Preferred Pharmacy Pharmacy Name Phone [phone] Your Updated Medication List  
  
   
This list is accurate as of: 11/6/17  1:54 PM.  Always use your most recent med list.  
  
  
  
  
 gabapentin 300 mg capsule Commonly known as:  NEURONTIN Take 300 mg by mouth three (3) times daily. meloxicam 15 mg tablet Commonly known as:  MOBIC Take 1 Tab by mouth daily.   
  
  
  
  
To-Do List   
 11/06/2017 Imaging:  [facility] BI SCREENING INCL CAD   
  
 11/06/2017 Pathology:  PAP IG, CT-NG TV HPV 16&18,45 ([id number], [id number]) 11/08/2017 11:00 AM  
  Appointment with Michelle Galeano, PTA at [address] ([phone])  
  
 11/10/2017 10:00 AM  
  Appointment with Alejandra OLIVEROS [address] at [facility] ([phone])  
  
 11/15/2017 12:00 PM  
  Appointment with Michelle Galeano, PTA at [address] [place] [address] ([phone])  
  
 11/17/2017 11:30 AM  
  Appointment with Michelle Galeano, PTA at [address] [place] [address] ([phone])  
  
 11/20/2017 11:30 AM  
  Appointment with Michelle Galeano, PTA at [address] [place] [address] ([phone])  
  
 11/29/2017 12:00 PM  
  Appointment with Michelle Galeano, PTA at [address] ([phone]) Introducing [facility]! [facility] introduces [employer] patient portal. Now you can access parts of your medical record, email your doctor's office, and request medication refills online. 1. In your internet browser, go to [email or web address] 2. Click on the First Time User? Click Here link in the Sign In box. You will see the New Member Sign Up page. 3. Enter your [employer] Access Code exactly as it appears below. You will not need to use this code after youve completed the sign-up process. If you do not sign up before the expiration date, you must request a new code. · [employer] Access Code: [email or web address] Expires: 1/22/2018  7:34 AM 
 
4. Enter the last four digits of your Social Security Number (xxxx) and Date of Birth (mm/dd/yyyy) as indicated and click Submit. You will be taken to the next sign-up page. 5. Create a [employer] ID. This will be your [employer] login ID and cannot be changed, so think of one that is secure and easy to remember. 6. Create a [employer] password. You can change your password at any time. 7. Enter your Password Reset Question and Answer.  This can be used at a later time if you forget your password. 8. Enter your e-mail address. You will receive e-mail notification when new information is available in [address] 9. Click Sign Up. You can now view and download portions of your medical record. 10. Click the Download Summary menu link to download a portable copy of your medical information. If you have questions, please visit the Frequently Asked Questions section of the Where website. Remember, Where is NOT to be used for urgent needs. For medical emergencies, dial 911. Now available from your iPhone and Android! Please provide this summary of care documentation to your next provider. Your primary care clinician is listed as Eliot 13. If you have any questions after today's visit, please call [phone].

## 2017-11-08 ENCOUNTER — APPOINTMENT (OUTPATIENT)
Dept: PHYSICAL THERAPY | Age: 47
End: 2017-11-08
Payer: MEDICAID

## 2017-11-08 LAB — BACTERIA UR CULT: NORMAL

## 2017-11-10 ENCOUNTER — APPOINTMENT (OUTPATIENT)
Dept: PHYSICAL THERAPY | Age: 47
End: 2017-11-10
Payer: MEDICAID

## 2017-11-11 LAB
C TRACH RRNA CVX QL NAA+PROBE: NEGATIVE
CYTOLOGIST CVX/VAG CYTO: NORMAL
CYTOLOGY CVX/VAG DOC THIN PREP: NORMAL
DX ICD CODE: NORMAL
HPV I/H RISK 1 DNA CVX QL PROBE+SIG AMP: NEGATIVE
Lab: NORMAL
N GONORRHOEA RRNA CVX QL NAA+PROBE: NEGATIVE
OTHER STN SPEC: NORMAL
PATH REPORT.FINAL DX SPEC: NORMAL
STAT OF ADQ CVX/VAG CYTO-IMP: NORMAL
T VAGINALIS RRNA SPEC QL NAA+PROBE: NEGATIVE

## 2017-11-15 ENCOUNTER — HOSPITAL ENCOUNTER (OUTPATIENT)
Dept: PHYSICAL THERAPY | Age: 47
Discharge: HOME | End: 2017-11-15
Payer: MEDICAID

## 2017-11-15 PROCEDURE — 97110 THERAPEUTIC EXERCISES: CPT

## 2017-11-15 PROCEDURE — 97140 MANUAL THERAPY 1/> REGIONS: CPT

## 2017-11-15 NOTE — PROGRESS NOTES
PHYSICAL THERAPY - DAILY TREATMENT NOTE    Patient Name: Sanjay Machuca        Date: 11/15/2017  : 1970   YES Patient  Verified  Visit #:   2   of   12  Insurance: Payor: BLUE CROSS MEDICAID / Plan: [address] [place] [address] / Product Type: Managed Care Medicaid /      In time: 12 Out time: 1240   Total Treatment Time: 40     Medicare Time Tracking (below)   Total Timed Codes (min):  40 1:1 Treatment Time:       TREATMENT AREA =  Low back pain [M54.5]    SUBJECTIVE  Pain Level (on 0 to 10 scale):  5  / 10   Medication Changes/New allergies or changes in medical history, any new surgeries or procedures? NO    If yes, update Summary List   Subjective Functional Status/Changes:  []  No changes reported     \"Pain is always in my back, sometimes in the (L) leg. The leg always has burning and numbness. \"           OBJECTIVE  25 min Therapeutic Exercise:  [x]  See flow sheet   Rationale:      increase ROM, increase strength and improve coordination to improve the patients ability to perform pain free ADLs. 15 Min Manual Therapy: STM/DTM and TPR (L) lumbar paraspinals, QL, glute/piriformis, glute med. Man lumbar distraction. Rationale:      decrease pain, increase ROM, increase tissue extensibility and decrease trigger points to improve patient's ability to perform pain free ADLs. min Patient Education:  YES  Reviewed HEP   []  Progressed/Changed HEP based on: Other Objective/Functional Measures: Added multiple exercises to improve core strength and stability for ADLs. See flow sheet. Post Treatment Pain Level (on 0 to 10) scale:   5  / 10     ASSESSMENT  Assessment/Changes in Function:     Pt reporting no change in symptoms after session. However did report relief w/ SB rollout, ta draws, and lumbar distraction.        []  See Progress Note/Recertification   Patient will continue to benefit from skilled PT services to modify and progress therapeutic interventions, address functional mobility deficits, address ROM deficits, address strength deficits, analyze and address soft tissue restrictions, analyze and cue movement patterns, analyze and modify body mechanics/ergonomics and assess and modify postural abnormalities to attain remaining goals. Progress toward goals / Updated goals:    Initiated therex.       PLAN  [x]  Upgrade activities as tolerated YES Continue plan of care   []  Discharge due to :    []  Other:      Therapist: Ed Michael PTA    Date: 11/15/2017 Time: 12:20 PM     Future Appointments  Date Time Provider Jessica Reyez   11/17/2017 11:30 AM Ed Oms, PTA [facility]   11/20/2017 11:30 AM Ed Oms, PTA [facility]   11/28/2017 1:45 PM MD BRANDO Arcos [facility]   11/29/2017 12:00 PM Ed Oms, [facility]

## 2017-11-17 ENCOUNTER — HOSPITAL ENCOUNTER (OUTPATIENT)
Dept: PHYSICAL THERAPY | Age: 47
Discharge: HOME | End: 2017-11-17
Payer: MEDICAID

## 2017-11-17 PROCEDURE — 97140 MANUAL THERAPY 1/> REGIONS: CPT

## 2017-11-17 PROCEDURE — 97110 THERAPEUTIC EXERCISES: CPT

## 2017-11-17 NOTE — PROGRESS NOTES
PHYSICAL THERAPY - DAILY TREATMENT NOTE    Patient Name: Tommy Mc        Date: 2017  : 1970   YES Patient  Verified  Visit #:   3   of   12  Insurance: Payor: BLUE CROSS MEDICAID / Plan: [facility] [employer] / Product Type: Managed Care Medicaid /      In time: 81 Out time:    Total Treatment Time: 45     Medicare Time Tracking (below)   Total Timed Codes (min):  35 1:1 Treatment Time:       TREATMENT AREA =  Low back pain [M54.5]    SUBJECTIVE  Pain Level (on 0 to 10 scale):  7  / 10   Medication Changes/New allergies or changes in medical history, any new surgeries or procedures? NO    If yes, update Summary List   Subjective Functional Status/Changes:  []  No changes reported     \"I wasn't very sore after the last appointment. \"  Reports compliance with HEP. OBJECTIVE  Modalities Rationale:     decrease pain and increase tissue extensibility to improve patient's ability to perform pain free ADLs. min [] Estim, type/location:                                      []  att     []  unatt     []  w/US     []  w/ice    []  w/heat    min []  Mechanical Traction: type/lbs                   []  pro   []  sup   []  int   []  cont    []  before manual    []  after manual    min []  Ultrasound, settings/location:      min []  Iontophoresis w/ dexamethasone, location:                                               []  take home patch       []  in clinic   10 min []  Ice     [x]  Heat    location/position: Prone, lumbar    min []  Vasopneumatic Device, press/temp:     min []  Other:    [x] Skin assessment post-treatment (if applicable):    [x]  intact    []  redness- no adverse reaction     []redness  adverse reaction:        27 min Therapeutic Exercise:  [x]  See flow sheet   Rationale:      increase ROM, increase strength and improve coordination to improve the patients ability to perform pain free ADLs. 8 Min Manual Therapy: Manual LS distraction in prone. Rationale:      decrease pain, increase ROM and increase tissue extensibility to improve patient's ability to perform pain free ADLs. min Patient Education:  YES  Reviewed HEP   []  Progressed/Changed HEP based on: Other Objective/Functional Measures: Added t-band resistance w/ sidelying clams, added deadbugs to improve core strength and stability for ADLs. See flow sheet. Post Treatment Pain Level (on 0 to 10) scale:   7  / 10     ASSESSMENT  Assessment/Changes in Function:     Pt reporting relief following MH, but no decrease in pain. Continued positive response to manual lumbar distraction. []  See Progress Note/Recertification   Patient will continue to benefit from skilled PT services to modify and progress therapeutic interventions, address functional mobility deficits, address ROM deficits, address strength deficits, analyze and address soft tissue restrictions, analyze and cue movement patterns, analyze and modify body mechanics/ergonomics and assess and modify postural abnormalities to attain remaining goals. Progress toward goals / Updated goals:    Minimal change in progress toward LTG's with today's session.       PLAN  [x]  Upgrade activities as tolerated YES Continue plan of care   []  Discharge due to :    []  Other:      Therapist: Isi Delarosa PTA    Date: 11/17/2017 Time: 11:46 AM     Future Appointments  Date Time Provider Jessica Reyez   11/20/2017 11:30 AM Isi Delarosa PTA [facility]   11/28/2017 1:45 PM Dimitry Yun MD [facility]   11/29/2017 12:00 PM Isi Delarosa PTA [facility]

## 2017-11-20 ENCOUNTER — HOSPITAL ENCOUNTER (OUTPATIENT)
Dept: PHYSICAL THERAPY | Age: 47
Discharge: HOME | End: 2017-11-20
Payer: MEDICAID

## 2017-11-20 PROCEDURE — 97110 THERAPEUTIC EXERCISES: CPT

## 2017-11-20 PROCEDURE — 97140 MANUAL THERAPY 1/> REGIONS: CPT

## 2017-11-20 NOTE — PROGRESS NOTES
PHYSICAL THERAPY - DAILY TREATMENT NOTE    Patient Name: Rachelle Lombardi        Date: 2017  : 1970   YES Patient  Verified  Visit #:     Insurance: Payor: Michelle Shaw / Plan: [address] [place] [address] / Product Type: Managed Care Medicaid /      In time:  Out time:    Total Treatment Time: 35     Medicare Time Tracking (below)   Total Timed Codes (min):  25 1:1 Treatment Time:       TREATMENT AREA =  Low back pain [M54.5]    SUBJECTIVE  Pain Level (on 0 to 10 scale):  8  / 10   Medication Changes/New allergies or changes in medical history, any new surgeries or procedures? NO    If yes, update Summary List   Subjective Functional Status/Changes:  []  No changes reported     \"I'm really sore today, can we just do the bike and some heat? \"  Pt reports she was moving furniture yesterday, and her low back was spasming w/ pain down her (R) leg today. OBJECTIVE  Modalities Rationale:     decrease pain and increase tissue extensibility to improve patient's ability to perform pain free ADLs. min [] Estim, type/location:                                      []  att     []  unatt     []  w/US     []  w/ice    []  w/heat    min []  Mechanical Traction: type/lbs                   []  pro   []  sup   []  int   []  cont    []  before manual    []  after manual    min []  Ultrasound, settings/location:      min []  Iontophoresis w/ dexamethasone, location:                                               []  take home patch       []  in clinic   10 min []  Ice     [x]  Heat    location/position: Prone, lumbar.      min []  Vasopneumatic Device, press/temp:     min []  Other:    [x] Skin assessment post-treatment (if applicable):    [x]  intact    []  redness- no adverse reaction     []redness  adverse reaction:        17 min Therapeutic Exercise:  [x]  See flow sheet   Rationale:      increase ROM, increase strength and improve coordination to improve the patients ability to perform pain free ADLs. 8 min Manual Therapy: Lumbar distraction in prone. Rationale:      decrease pain, increase ROM, increase tissue extensibility and decrease trigger points to improve patient's ability to perform pain free ADLs. min Patient Education:  YES  Reviewed HEP   []  Progressed/Changed HEP based on: Other Objective/Functional Measures: Therex per flow sheet. Held multiple exercises due to c/o pain. Focus on stretch, pain relief. Updated HEP, see chart. Post Treatment Pain Level (on 0 to 10) scale:   5  / 10     ASSESSMENT  Assessment/Changes in Function:     Good pain response to session. []  See Progress Note/Recertification   Patient will continue to benefit from skilled PT services to modify and progress therapeutic interventions, address functional mobility deficits, address ROM deficits, address strength deficits, analyze and address soft tissue restrictions, analyze and cue movement patterns, analyze and modify body mechanics/ergonomics and assess and modify postural abnormalities to attain remaining goals. Progress toward goals / Updated goals:    No change in progress toward LTG's with today's session.       PLAN  [x]  Upgrade activities as tolerated YES Continue plan of care   []  Discharge due to :    []  Other:      Therapist: Carolina Gomes PTA    Date: 11/20/2017 Time: 11:35 AM     Future Appointments  Date Time Provider Jessica Reyez   11/28/2017 1:45 PM Wilmer Islas MD [facility]   11/29/2017 12:00 PM Carolina Gomes PTA [facility]

## 2017-11-28 ENCOUNTER — OFFICE VISIT (OUTPATIENT)
Dept: FAMILY MEDICINE CLINIC | Age: 47
End: 2017-11-28

## 2017-11-28 VITALS
HEIGHT: 62 IN | HEART RATE: 63 BPM | TEMPERATURE: 98.1 F | SYSTOLIC BLOOD PRESSURE: 124 MMHG | DIASTOLIC BLOOD PRESSURE: 88 MMHG | BODY MASS INDEX: 43.61 KG/M2 | OXYGEN SATURATION: 98 % | RESPIRATION RATE: 18 BRPM | WEIGHT: 237 LBS

## 2017-11-28 DIAGNOSIS — R03.0 ELEVATED BLOOD PRESSURE READING: ICD-10-CM

## 2017-11-28 DIAGNOSIS — R31.9 HEMATURIA, UNSPECIFIED TYPE: Primary | ICD-10-CM

## 2017-11-28 PROBLEM — I10 ESSENTIAL HYPERTENSION: Status: RESOLVED | Noted: 2017-10-24 | Resolved: 2017-11-28

## 2017-11-28 PROBLEM — E66.01 OBESITY, MORBID (HCC): Status: ACTIVE | Noted: 2017-11-28

## 2017-11-28 LAB
BILIRUB UR QL STRIP: NEGATIVE
GLUCOSE UR-MCNC: NEGATIVE MG/DL
KETONES P FAST UR STRIP-MCNC: NEGATIVE MG/DL
PH UR STRIP: 5.5 [PH] (ref 4.6–8)
PROT UR QL STRIP: NEGATIVE
SP GR UR STRIP: 1.02 (ref 1–1.03)
UA UROBILINOGEN AMB POC: NORMAL (ref 0.2–1)
URINALYSIS CLARITY POC: CLEAR
URINALYSIS COLOR POC: YELLOW
URINE BLOOD POC: NORMAL
URINE LEUKOCYTES POC: NEGATIVE
URINE NITRITES POC: NEGATIVE

## 2017-11-28 RX ORDER — QUETIAPINE FUMARATE 50 MG/1
50 TABLET, FILM COATED ORAL 2 TIMES DAILY
COMMUNITY
End: 2018-01-24

## 2017-11-28 RX ORDER — ALBUTEROL SULFATE 90 UG/1
1 AEROSOL, METERED RESPIRATORY (INHALATION)
Qty: 1 INHALER | Refills: 0 | Status: SHIPPED | OUTPATIENT
Start: 2017-11-28 | End: 2018-01-24

## 2017-11-28 RX ORDER — ALBUTEROL SULFATE 90 UG/1
1 AEROSOL, METERED RESPIRATORY (INHALATION)
Qty: 1 INHALER | Refills: 0 | Status: SHIPPED | OUTPATIENT
Start: 2017-11-28 | End: 2017-11-28 | Stop reason: SDUPTHER

## 2017-11-28 RX ORDER — SERTRALINE HYDROCHLORIDE 50 MG/1
TABLET, FILM COATED ORAL DAILY
COMMUNITY
End: 2018-01-24

## 2017-11-28 NOTE — PATIENT INSTRUCTIONS
You have been referred to urology. Please call one of the preferred providers listed below and schedule your appointment. Once you have scheduled your appointment, please call the office at [phone] and leave the details of your appointment (provider you will be seeing, appointment date and time) on the voice mail.     [facility]  Dr. Desire Berry  [phone] [facility],  [facility]

## 2017-11-28 NOTE — PROGRESS NOTES
Flori Tracy is a 50 y.o. female (: 1970) presenting to address:    Chief Complaint   Patient presents with    Hypertension    Cholesterol Problem    Medication Refill           Depression Screening:     PHQ over the last two weeks 10/24/2017   PHQ Not Done -   Little interest or pleasure in doing things More than half the days   Feeling down, depressed or hopeless More than half the days   Total Score PHQ 2 4   Trouble falling or staying asleep, or sleeping too much Nearly every day   Feeling tired or having little energy Nearly every day   Poor appetite or overeating Nearly every day   Feeling bad about yourself - or that you are a failure or have let yourself or your family down Not at all   Trouble concentrating on things such as school, work, reading or watching TV Nearly every day   Moving or speaking so slowly that other people could have noticed; or the opposite being so fidgety that others notice Not at all   Thoughts of being better off dead, or hurting yourself in some way Not at all   PHQ 9 Score 16     Fall Risk Assessment:     Fall Risk Assessment, last 12 mths 2017   Able to walk? Yes   Fall in past 12 months? Yes   Fall with injury? No   Number of falls in past 12 months 1   Fall Risk Score 1     Abuse Screening:     Abuse Screening Questionnaire 10/24/2017   Do you ever feel afraid of your partner? N   Are you in a relationship with someone who physically or mentally threatens you? N   Is it safe for you to go home? Y     Coordination of Care Questionaire:   1. Have you been to the ER, urgent care clinic since your last visit? Hospitalized since your last visit? NO    2. Have you seen or consulted any other health care providers outside of the [address] [place] [address] since your last visit? Include any pap smears or colon screening. NO    Advanced Directive:   1. Do you have an Advanced Directive? NO    2. Would you like information on Advanced Directives?  YES

## 2017-11-28 NOTE — MR AVS SNAPSHOT
Visit Information Date & Time Provider Department Dept. Phone Encounter #  
 11/28/2017  1:45 PM Jaycob Sotelo, 3 [facility] [phone] Follow-up Instructions Return in about 6 months (around 5/28/2018). Upcoming Health Maintenance Date Due Pneumococcal 19-64 Medium Risk (1 of 1 - PPSV23) 10/20/1987 DTaP/Tdap/Td series (1 - Tdap) 10/20/1989 PAP AKA CERVICAL CYTOLOGY 11/6/2020 Allergies as of 11/28/2017  Review Complete On: 11/28/2017 By: Jaycob Sotelo MD  
  
 Severity Noted Reaction Type Reactions Codeine High 05/25/2017    Anaphylaxis Pcn [Penicillins]  05/25/2017    Rash Current Immunizations  Never Reviewed Name Date Influenza Vaccine (Quad) PF 10/24/2017  8:44 AM  
  
 Not reviewed this visit You Were Diagnosed With   
  
 Codes Comments Hematuria, unspecified type    -  Primary ICD-10-CM: R31.9 ICD-9-CM: 599.70 Elevated blood pressure reading     ICD-10-CM: R03.0 ICD-9-CM: 796.2 Vitals BP Pulse Temp Resp Height(growth percentile) Weight(growth percentile) 124/88 (BP 1 Location: Left arm, BP Patient Position: Sitting) 63 98.1 °F (36.7 °C) (Oral) 18 5' 2\" (1.575 m) 237 lb (107.5 kg) SpO2 BMI OB Status Smoking Status 98% 43.35 kg/m2 Postmenopausal Current Every Day Smoker Vitals History BMI and BSA Data Body Mass Index Body Surface Area  
 43.35 kg/m 2 2.17 m 2 Preferred Pharmacy Pharmacy Name Phone [phone] Your Updated Medication List  
  
   
This list is accurate as of: 11/28/17  2:12 PM.  Always use your most recent med list.  
  
  
  
  
 albuterol 90 mcg/actuation inhaler Commonly known as:  PROVENTIL HFA, VENTOLIN HFA, PROAIR HFA Take 1 Puff by inhalation every four (4) hours as needed for Wheezing. gabapentin 300 mg capsule Commonly known as:  NEURONTIN Take 300 mg by mouth three (3) times daily. meloxicam 15 mg tablet Commonly known as:  MOBIC Take 1 Tab by mouth daily. SEROquel 50 mg tablet Generic drug:  QUEtiapine Take 50 mg by mouth two (2) times a day. ZOLOFT 50 mg tablet Generic drug:  sertraline Take  by mouth daily. Prescriptions Printed Refills  
 albuterol (PROVENTIL HFA, VENTOLIN HFA, PROAIR HFA) 90 mcg/actuation inhaler 0 Sig: Take 1 Puff by inhalation every four (4) hours as needed for Wheezing. Class: Print Route: Inhalation We Performed the Following AMB POC URINALYSIS DIP STICK AUTO W/O MICRO [82207 CPT(R)] Follow-up Instructions Return in about 6 months (around 5/28/2018). To-Do List   
 11/29/2017 12:00 PM  
  Appointment with Teresa Ramon PTA at [address] ([phone]) Patient Instructions You have been referred to urology. Please call one of the preferred providers listed below and schedule your appointment. Once you have scheduled your appointment, please call the office at [phone] and leave the details of your appointment (provider you will be seeing, appointment date and time) on the voice mail. [facility] Dr. Mayra Pantoja [address] [facility],  [address] [place] [address] & [facility] SERVICES! Dear Aleida Lorenzo: 
Thank you for requesting a [employer] account. Our records indicate that you already have an active [employer] account. You can access your account anytime at [email or web address] Did you know that you can access your hospital and ER discharge instructions at any time in [employer]? You can also review all of your test results from your hospital stay or ER visit. Additional Information If you have questions, please visit the Frequently Asked Questions section of the [employer] website at [email or web address] Remember, [employer] is NOT to be used for urgent needs. For medical emergencies, dial 911. Now available from your iPhone and Android! Please provide this summary of care documentation to your next provider. Your primary care clinician is listed as Eliot Graves. If you have any questions after today's visit, please call [phone].

## 2017-11-28 NOTE — PROGRESS NOTES
Heidi Sullivan is a 50 y.o. female (: 1970) presenting to address:    Chief Complaint   Patient presents with    Hypertension    Cholesterol Problem    Medication Refill       Vitals:    17 1344   BP: 124/88   Pulse: 63   Resp: 18   Temp: 98.1 °F (36.7 °C)   TempSrc: Oral   SpO2: 98%   Weight: 237 lb (107.5 kg)   Height: 5' 2\" (1.575 m)   PainSc:   6   PainLoc: Generalized       Depression Screening:     PHQ over the last two weeks 10/24/2017   PHQ Not Done -   Little interest or pleasure in doing things More than half the days   Feeling down, depressed or hopeless More than half the days   Total Score PHQ 2 4   Trouble falling or staying asleep, or sleeping too much Nearly every day   Feeling tired or having little energy Nearly every day   Poor appetite or overeating Nearly every day   Feeling bad about yourself - or that you are a failure or have let yourself or your family down Not at all   Trouble concentrating on things such as school, work, reading or watching TV Nearly every day   Moving or speaking so slowly that other people could have noticed; or the opposite being so fidgety that others notice Not at all   Thoughts of being better off dead, or hurting yourself in some way Not at all   PHQ 9 Score 16     Fall Risk Assessment:     Fall Risk Assessment, last 12 mths 2017   Able to walk? Yes   Fall in past 12 months? Yes   Fall with injury? No   Number of falls in past 12 months 1   Fall Risk Score 1     Abuse Screening:     Abuse Screening Questionnaire 10/24/2017   Do you ever feel afraid of your partner? N   Are you in a relationship with someone who physically or mentally threatens you? N   Is it safe for you to go home? Y     Coordination of Care Questionaire:   1. Have you been to the ER, urgent care clinic since your last visit? Hospitalized since your last visit? NO    2.  Have you seen or consulted any other health care providers outside of the [address] [place] [address] since your last visit? Include any pap smears or colon screening. NO    Advanced Directive:   1. Do you have an Advanced Directive? NO    2. Would you like information on Advanced Directives?  YES

## 2017-11-28 NOTE — PROGRESS NOTES
Assessment/Plan:    1. Hematuria, unspecified type  - has referral to urology pending.   - AMB POC URINALYSIS DIP STICK AUTO W/O MICRO    2. Elevated blood pressure reading  -dash diet    The plan was discussed with the patient. The patient verbalized understanding and is in agreement with the plan. All medication potential side effects were discussed with the patient. Health Maintenance:   Health Maintenance   Topic Date Due    Pneumococcal 19-64 Medium Risk (1 of 1 - PPSV23) 10/20/1987    DTaP/Tdap/Td series (1 - Tdap) 10/20/1989    PAP AKA CERVICAL CYTOLOGY  11/06/2020    Influenza Age 5 to Adult  Completed       Kp Galvan is a 50 y.o. female and presents with Hypertension; Cholesterol Problem; and Medication Refill     Subjective:    HTN - bp better today  Previous readings (last month) were high. Pt is requesting refill of ventolin. She was given it for \"bronchitis\". She uses it only when she gets sick. She is a smoker. Was noted to have hematuria by gyn. They recommended she see urology. ROS:  Constitutional: No recent weight change. No weakness/fatigue. No f/c. Cardiovascular: No CP/palpitations. No LYLES/orthopnea/PND. Respiratory: No cough/sputum, dyspnea, wheezing. Gastointestinal: No dysphagia, reflux. No n/v. No constipation/diarrhea. No melena/rectal bleeding. Genitourinary: No dysuria, urinary hesitancy, nocturia, hematuria. No incontinence. The problem list was updated as a part of today's visit. Patient Active Problem List   Diagnosis Code    Tobacco use Z72.0    Radicular low back pain M54.10    Prediabetes R73.03    Low HDL (under 40) E78.6    Mixed hypercholesterolemia and hypertriglyceridemia E78.2    Fibromyalgia M79.7    Ganglion of wrist M67.439    Essential hypertension I10    Moderate single current episode of major depressive disorder ([address] F32.1    Obesity, morbid ([address] E66.01       The PSH, FH were reviewed.     SH:  Social History Substance Use Topics    Smoking status: Current Every Day Smoker    Smokeless tobacco: Never Used    Alcohol use Yes       Medications/Allergies:  Current Outpatient Prescriptions on File Prior to Visit   Medication Sig Dispense Refill    meloxicam (MOBIC) 15 mg tablet Take 1 Tab by mouth daily.  gabapentin (NEURONTIN) 300 mg capsule Take 300 mg by mouth three (3) times daily. No current facility-administered medications on file prior to visit. Allergies   Allergen Reactions    Codeine Anaphylaxis    Pcn [Penicillins] Rash       Objective:  Visit Vitals    /88 (BP 1 Location: Left arm, BP Patient Position: Sitting)    Pulse 63    Temp 98.1 °F (36.7 °C) (Oral)    Resp 18    Ht 5' 2\" (1.575 m)    Wt 237 lb (107.5 kg)    SpO2 98%    BMI 43.35 kg/m2      Constitutional: Well developed, nourished, no distress, alert, obese habitus   CV: S1, S2.  RRR. No murmurs/rubs. No thrills palpated. No carotid bruits. Intact distal pulses. No edema. Pulm: No abnormalities on inspection. Clear to auscultation bilaterally. No wheezing/rhonchi. Normal effort. Psych: Appropriate affect, judgement and insight. Short-term memory intact.        Labwork and Ancillary Studies:    CBC w/Diff  Lab Results   Component Value Date/Time    WBC 10.8 10/24/2017 07:35 AM    HGB 14.3 10/24/2017 07:35 AM    PLATELET 211 46/09/4444 07:35 AM         Basic Metabolic Profile/LFTs  Lab Results   Component Value Date/Time    Sodium 139 10/24/2017 07:35 AM    Potassium 3.9 10/24/2017 07:35 AM    Chloride 99 10/24/2017 07:35 AM    CO2 22 10/24/2017 07:35 AM    Glucose 87 10/24/2017 07:35 AM    BUN 13 10/24/2017 07:35 AM    Creatinine 1.14 10/24/2017 07:35 AM    BUN/Creatinine ratio 11 10/24/2017 07:35 AM    GFR est AA 65 10/24/2017 07:35 AM    GFR est non-AA 57 10/24/2017 07:35 AM    Calcium 9.1 10/24/2017 07:35 AM      Lab Results   Component Value Date/Time    ALT (SGPT) 41 10/24/2017 07:35 AM    AST (SGOT) 16 10/24/2017 07:35 AM    Alk.  phosphatase 83 10/24/2017 07:35 AM    Bilirubin, total <0.2 10/24/2017 07:35 AM       Cholesterol  Lab Results   Component Value Date/Time    Cholesterol, total 199 10/24/2017 07:35 AM    HDL Cholesterol 37 10/24/2017 07:35 AM    LDL, calculated 114 10/24/2017 07:35 AM    Triglyceride 239 10/24/2017 07:35 AM

## 2017-11-29 ENCOUNTER — HOSPITAL ENCOUNTER (OUTPATIENT)
Dept: PHYSICAL THERAPY | Age: 47
Discharge: HOME | End: 2017-11-29
Payer: MEDICAID

## 2017-11-29 PROCEDURE — 97110 THERAPEUTIC EXERCISES: CPT

## 2017-11-29 NOTE — PROGRESS NOTES
PHYSICAL THERAPY - DAILY TREATMENT NOTE    Patient Name: Osbaldo Strickland        Date: 2017  : 1970   YES Patient  Verified  Visit #:      of   12  Insurance: Payor: [employer] / Plan: [address] [place] [address] / Product Type: Managed Care Medicaid /      In time:  Out time:    Total Treatment Time: 35     Medicare Time Tracking (below)   Total Timed Codes (min):  35 1:1 Treatment Time:       TREATMENT AREA =  Low back pain [M54.5]    SUBJECTIVE  Pain Level (on 0 to 10 scale):  4  / 10   Medication Changes/New allergies or changes in medical history, any new surgeries or procedures? NO    If yes, update Summary List   Subjective Functional Status/Changes:  []  No changes reported     \"My pain has gotten a lot better. \"       OBJECTIVE  35 min Therapeutic Exercise:  [x]  See flow sheet   Rationale:      increase ROM, increase strength and improve coordination to improve the patients ability to perform pain free ADLs. min Patient Education:  YES  Reviewed HEP   []  Progressed/Changed HEP based on: Other Objective/Functional Measures: Therex per flow sheet. Post Treatment Pain Level (on 0 to 10) scale:   2-3  / 10     ASSESSMENT  Assessment/Changes in Function:     No exacerbation of symptoms with today's session. []  See Progress Note/Recertification   Patient will continue to benefit from skilled PT services to modify and progress therapeutic interventions, address functional mobility deficits, address ROM deficits, address strength deficits, analyze and address soft tissue restrictions, analyze and cue movement patterns, analyze and modify body mechanics/ergonomics and assess and modify postural abnormalities to attain remaining goals. Progress toward goals / Updated goals:    No change in progress toward LTG's with today's session.       PLAN  [x]  Upgrade activities as tolerated YES Continue plan of care   []  Discharge due to :    []  Other: Therapist: Ed MEERA Rodriguez    Date: 11/29/2017 Time: 12:32 PM     Future Appointments  Date Time Provider Jessica Reyez   12/7/2017 11:30 AM Ed MEERA Rodrigeuz [facility]   1/5/2018 11:30 AM Ed MEERA Rodriguez [facility]

## 2017-12-07 ENCOUNTER — HOSPITAL ENCOUNTER (OUTPATIENT)
Dept: PHYSICAL THERAPY | Age: 47
Discharge: HOME | End: 2017-12-07
Payer: MEDICAID

## 2017-12-07 PROCEDURE — 97110 THERAPEUTIC EXERCISES: CPT

## 2017-12-07 NOTE — PROGRESS NOTES
PHYSICAL THERAPY - DAILY TREATMENT NOTE    Patient Name: Juan Pepper        Date: 2017  : 1970   YES Patient  Verified  Visit #:     Insurance: Payor: Ai Robertson / Plan: [address] [place] [address] / Product Type: Managed Care Medicaid /      In time:  Out time: 12   Total Treatment Time: 30     Medicare Time Tracking (below)   Total Timed Codes (min):  30 1:1 Treatment Time:  30     TREATMENT AREA =  Low back pain [M54.5]    SUBJECTIVE  Pain Level (on 0 to 10 scale):  5-6  / 10   Medication Changes/New allergies or changes in medical history, any new surgeries or procedures? NO    If yes, update Summary List   Subjective Functional Status/Changes:  []  No changes reported     \"I feel better with the movement, but my pain is not going away. \"          OBJECTIVE    30 min Therapeutic Exercise:  [x]  See flow sheet   Rationale:      increase ROM, increase strength and improve coordination to improve the patients ability to perform pain free ADLs. min Patient Education:  YES  Reviewed HEP   []  Progressed/Changed HEP based on: Other Objective/Functional Measures:    Lumbar AROM:   Flex/ext/(B) SB WNL   (R) rot 50% limited  (L) rotation 75% limited    Hip strength:   Ext 5/5 (B)   Abd 5/5 (R), 4+/5 (L). FOTO = 50  GROC = +5     Post Treatment Pain Level (on 0 to 10) scale:   5  / 10     ASSESSMENT  Assessment/Changes in Function:     See PN     []  See Progress Note/Recertification   Patient will continue to benefit from skilled PT services to modify and progress therapeutic interventions, address functional mobility deficits, address ROM deficits, address strength deficits, analyze and address soft tissue restrictions, analyze and cue movement patterns, analyze and modify body mechanics/ergonomics and assess and modify postural abnormalities to attain remaining goals.    Progress toward goals / Updated goals:    See PN     PLAN  [x]  Upgrade activities as tolerated YES Continue plan of care   []  Discharge due to :    []  Other:      Therapist: Hai Zhu PTA    Date: 12/7/2017 Time: 11:37 AM     Future Appointments  Date Time Provider Jessica Reyez   1/5/2018 11:30 AM Hai Zhu PTA [facility]

## 2017-12-07 NOTE — PROGRESS NOTES
[facility] PHYSICAL THERAPY  [address] [place] [address] [address],[facility], [facility] - Phone: [phone]  Fax: [phone]  PROGRESS NOTE  Patient Name: Garret Mohamud : 1970   Treatment/Medical Diagnosis: Low back pain [M54.5]   Referral Source: Reji Jordan MD     Date of Initial Visit: 2017 Attended Visits: 6 Missed Visits: 0     SUMMARY OF TREATMENT  PT has consisted of initial evaluation, therapeutic exercises, HEP, manual therapy, patient education, and modalities. CURRENT STATUS  Pt presented to [facility] w/ c/o LBP and (L) leg pain. Pt reporting improvement in mobility since beginning PT, however minimal change in pain levels. Lumbar AROM WNL flex/ext and (B) SB, 50% limited with (R) rot, 75% limited with (L) rot. Hip strength = 5/5 (B) ext, 5/5 (R) abd, 4+/5 (L) abd. See below for current FOTO/GROC. Pt to be out of town for up to 4 weeks, plans to resume 2x weekly upon return. (I) and compliant with current HEP. Goal/Measure of Progress Goal Met? 1. Pt will improve FOTO score to >/= 57 to demo a significant improvement in functional activity tolerance. Status at last Eval: 47 Current Status: 50 progressing   2. Pt will achieve >/= +4 GROC in order to promote increased activity tolerance. Status at last Eval: Na  Current Status: +5  yes   3. Pt will note pain less than or equal to 3/10 at worst to allow increase in functional abilities. Status at last Eval: 10/10 max  Current Status: 7/10 progressing     4. Pt will increase bilateral glute strength to >/= 4-/5 to improve axial stability with standing/walking. Status at last Eval: (B) glute 3/5  Current Status: Ext 5/5  Abd 5/5 (R), 4+/5 (L) yes       New Goals to be achieved in __4__  weeks:  1. Pt will improve FOTO score to >/= 57 to demo a significant improvement in functional activity tolerance.   2. Pt will achieve >/= +6 GROC in order to promote increased activity tolerance. 3. Pt will note pain less than or equal to 3/10 at worst to allow increase in functional abilities. 4. Pt will be (I) and compliant with long-term HEP in preparation for DC to HEP. RECOMMENDATIONS  Pt to continue 2-3x weekly for up to an additional 4 weeks following return to PT to further decrease pain and improve pain-free lumbar ROM. Thank you for this referral.   If you have any questions/comments please contact us directly at [phone]. Thank you for allowing us to assist in the care of your patient. LPTA Signature: Karla Davison PTA  Date: 12/7/2017   PT Signature: OMAIRA Calderón Time: 12:28 PM   NOTE TO PHYSICIAN:  PLEASE COMPLETE THE ORDERS BELOW AND FAX TO   [facility] [place] [facility]: [phone]  If you are unable to process this request in 24 hours please contact our office: [phone]    ___ I have read the above report and request that my patient continue as recommended.   ___ I have read the above report and request that my patient continue therapy with the following changes/special instructions:_________________________________________________________   ___ I have read the above report and request that my patient be discharged from therapy.      Physician Signature:        Date:       Time:

## 2018-01-05 ENCOUNTER — APPOINTMENT (OUTPATIENT)
Dept: PHYSICAL THERAPY | Age: 48
End: 2018-01-05
Payer: MEDICAID

## 2018-01-11 ENCOUNTER — HOSPITAL ENCOUNTER (OUTPATIENT)
Dept: PHYSICAL THERAPY | Age: 48
Discharge: HOME | End: 2018-01-11
Payer: MEDICAID

## 2018-01-11 PROCEDURE — 97110 THERAPEUTIC EXERCISES: CPT

## 2018-01-11 PROCEDURE — 97140 MANUAL THERAPY 1/> REGIONS: CPT

## 2018-01-11 NOTE — PROGRESS NOTES
Shaylee Duncan 29  [facility]  [address] [facility], [address],[facility], [address] - Phone: [phone]  Fax: [phone]  PROGRESS NOTE  Patient Name: Lorene Dickey : 1970   Treatment/Medical Diagnosis: Low back pain [M54.5]   Referral Source: Loretta Trevino MD     Date of Initial Visit: 17 Attended Visits: 7 Missed Visits: 0     SUMMARY OF TREATMENT  PT has consisted of initial evaluation, therapeutic exercises, HEP, manual therapy, patient education, and modalities. CURRENT STATUS   Pt rates overall improvement as 40% with functional activities since [facility]. \"I was feeling really good with the therapy until I had to leave for vacation\". Pt reports acute exacerbation of central LBP which she attributes to lifting heavy suitcase (~ 50 lbs) over the holidays. States LLE numbness has been constant and unchanged \"for years\". Current improvements: Pt reports improved ROM and tolerance for household chores, lifting, carrying. Current objective impairments:  Average pain= 8/10, least pain= 6/10. Max pain= 10/10. AROM lumbar: flex/ROT WNL with end rand pain into R ROT. Ext= 50%, SB b/l= 75%. MMT:  Hip Ext (R) 5, (L) 4+;  Abd (R) 5, (L) 4. Current functional limitations:  Unable to sleep through the night, limited household chores, bending, standing, lifting. Goal/Measure of Progress Goal Met? 1. Pt will improve FOTO score to >/= 57 to demo a significant improvement in functional activity tolerance. Status at last Eval: 50 on 17 Current Status: 28 no   2. Pt will achieve >/= +6 GROC in order to promote increased activity tolerance. Status at last Eval: +5 \"a good deal better\" on 17 Current Status: -6 \"a great deal worse\" no   3. Pt will note pain less than or equal to 3/10 at worst to allow increase in functional abilities. Status at last Eval: 10 Current Status: 10 no     New Goals to be achieved in __4__  weeks:   All unmet goals above and:  4. Pt will be (I) and compliant with long-term HEP in preparation for DC to HEP. RECOMMENDATIONS  Pt to continue 2-3x weekly for up to an additional 4 weeks following return to PT to further decrease pain and improve pain-free lumbar ROM. Thank you for this referral.     If you have any questions/comments please contact us directly at [phone]. Thank you for allowing us to assist in the care of your patient. Therapist Signature: Ian Lpoez. Fred PT Date: 1/11/2018     Time: 8:39 AM   NOTE TO PHYSICIAN:  PLEASE COMPLETE THE ORDERS BELOW AND FAX TO   [facility]: [phone]  If you are unable to process this request in 24 hours please contact our office: [phone]    ___ I have read the above report and request that my patient continue as recommended.   ___ I have read the above report and request that my patient continue therapy with the following changes/special instructions:_________________________________________________________   ___ I have read the above report and request that my patient be discharged from therapy.      Physician Signature:        Date:       Time:

## 2018-01-11 NOTE — PROGRESS NOTES
PHYSICAL THERAPY - DAILY TREATMENT NOTE    Patient Name: Lavern Conklin        Date: 2018  : 1970   YES Patient  Verified  Visit #:     Insurance: Payor: BLUE CROSS MEDICAID / Plan: [facility] HEALTHKEEPERS PLUS / Product Type: Managed Care Medicaid /      In time: 491 Out time: 916   Total Treatment Time: 46     Medicare Time Tracking (below)   Total Timed Codes (min):  40 1:1 Treatment Time:  n/a     TREATMENT AREA =  Low back pain [M54.5]    SUBJECTIVE  Pain Level (on 0 to 10 scale):  8  / 10   Medication Changes/New allergies or changes in medical history, any new surgeries or procedures? NO    If yes, update Summary List   Subjective Functional Status/Changes:  []  No changes reported     \"I was out of town for a month and carrying heavy suitcases. I woke up this morning and I have a lot of severe pain down the center of my back. I've been doing the HEP and it helps a little bit, but not when I'm hurting so bad\". OBJECTIVE  Modality rationale: decrease pain to improve the patients ability to perform functional mobility/ADLs and attain goals.    Min Type Additional Details   10 [x] Estim:  [x]Unatt       [x]IFC  []Premod                        []Other:  [x]w/ice   []w/heat  Position: supine with LE wedge  Location: L/S    [] Estim: []Att    []TENS instruct  []NMES                    []Other:  []w/US   []w/ice   []w/heat  Position:  Location:    []  Traction: [] Cervical       []Lumbar                       [] Prone          []Supine                       []Intermittent   []Continuous Lbs:  [] before manual  [] after manual    []  Ultrasound: []Continuous   [] Pulsed                           []1MHz   []3MHz Location:  W/cm2:    []  Iontophoresis with dexamethasone         Location: [] Take home patch   [] In clinic    []  Ice     []  heat  []  Ice massage  []  Laser   []  Anodyne Position:  Location:    []  Laser with stim  []  Other: Position:  Location:    [] Vasopneumatic Device Pressure:       [] lo [] med [] hi   Temperature: [] lo [] med [] hi   [x] Skin assessment post-treatment:  [x]intact []redness- no adverse reaction    []redness  adverse reaction:       30 min Therapeutic Exercise:  [x]  See flow sheet   Rationale:      increase ROM, increase strength and improve coordination to improve the patients ability to perform pain free ADLs. min Patient Education:  YES  Reviewed HEP   []  Progressed/Changed HEP based on: Other Objective/Functional Measures:    See PN     Post Treatment Pain Level (on 0 to 10) scale:   7.5  / 10     ASSESSMENT  Assessment/Changes in Function:     Limited exercise tolerance due to pain c/o. Poor tolerance for manual therapy due to hypertonicity and muscle spasms to b/l thoraco-lumbar paraspinals. Repeated movement testing and prone lying provide no centralization for sx's and actually cause pt to report increased LBP. Will require skilled progression to attain goals. []  See Progress Note/Recertification   Patient will continue to benefit from skilled PT services to modify and progress therapeutic interventions, address functional mobility deficits, address ROM deficits, address strength deficits, analyze and address soft tissue restrictions, analyze and cue movement patterns, analyze and modify body mechanics/ergonomics and assess and modify postural abnormalities to attain remaining goals. Progress toward goals / Updated goals:    See PN     PLAN  [x]  Upgrade activities as tolerated YES Continue plan of care   []  Discharge due to :    []  Other:      Therapist: Perri Tan. Tejal Joseph, PT    Date: 1/11/2018 Time: 11:37 AM     Future Appointments  Date Time Provider Jessica Reyez   1/11/2018 8:30 AM Nikki Martinez [facility]   1/24/2018 2:30 PM Julieth Casey MD [address] [facility]

## 2018-01-17 ENCOUNTER — HOSPITAL ENCOUNTER (OUTPATIENT)
Dept: PHYSICAL THERAPY | Age: 48
Discharge: HOME | End: 2018-01-17
Payer: MEDICAID

## 2018-01-17 PROCEDURE — 97110 THERAPEUTIC EXERCISES: CPT

## 2018-01-17 PROCEDURE — 97014 ELECTRIC STIMULATION THERAPY: CPT

## 2018-01-17 NOTE — PROGRESS NOTES
PHYSICAL THERAPY - DAILY TREATMENT NOTE    Patient Name: Jasmyne Katz        Date: 2018  : 1970   YES Patient  Verified  Visit #:   8   of   12  Insurance: Payor: BLUE CROSS MEDICAID / Plan: [facility] [employer] HEALTHKEEPERS PLUS / Product Type: Managed Care Medicaid /      In time: 3 Out time: 350   Total Treatment Time: 50     Medicare Time Tracking (below)   Total Timed Codes (min):  50 1:1 Treatment Time:       TREATMENT AREA =  Low back pain [M54.5]    SUBJECTIVE  Pain Level (on 0 to 10 scale):  5  / 10   Medication Changes/New allergies or changes in medical history, any new surgeries or procedures? NO    If yes, update Summary List   Subjective Functional Status/Changes:  []  No changes reported     Pain always down (L) leg, feels more like numbness/burning/tingling. OBJECTIVE  Modalities Rationale:     decrease inflammation and decrease pain to improve patient's ability to perform pain free ADLs. 10 min [x] Estim, type/location: Lumbar IFC                                    []  att     [x]  unatt     []  w/US     [x]  w/ice    []  w/heat    min []  Mechanical Traction: type/lbs                   []  pro   []  sup   []  int   []  cont    []  before manual    []  after manual    min []  Ultrasound, settings/location:      min []  Iontophoresis w/ dexamethasone, location:                                               []  take home patch       []  in clinic    min []  Ice     []  Heat    location/position:     min []  Vasopneumatic Device, press/temp:     min []  Other:    [x] Skin assessment post-treatment (if applicable):    [x]  intact    []  redness- no adverse reaction     []redness  adverse reaction:        40 min Therapeutic Exercise:  [x]  See flow sheet   Rationale:      increase ROM, increase strength and improve coordination to improve the patients ability to perform pain free ADLs.        min Patient Education:  YES  Reviewed HEP   []  Progressed/Changed HEP based on: Other Objective/Functional Measures: Therex per flow sheet. Post Treatment Pain Level (on 0 to 10) scale:   5  / 10     ASSESSMENT  Assessment/Changes in Function:     Increase in lumbar pain w/ VLADISLAV @ wall, no increase in radicular symptoms. []  See Progress Note/Recertification   Patient will continue to benefit from skilled PT services to modify and progress therapeutic interventions, address functional mobility deficits, address ROM deficits, address strength deficits, analyze and address soft tissue restrictions, analyze and cue movement patterns, analyze and modify body mechanics/ergonomics and assess and modify postural abnormalities to attain remaining goals. Progress toward goals / Updated goals:    Reduction in reported pain from previous session. Progressing toward LTG #3. PLAN  [x]  Upgrade activities as tolerated YES Continue plan of care   []  Discharge due to :    []  Other:      Therapist: Tawny Henry PTA    Date: 1/17/2018 Time: 3:09 PM     Future Appointments  Date Time Provider Jessica Reyez   1/19/2018 2:30 PM Tawny Henry PTA [facility]   1/23/2018 3:00 PM Tawny Henry PTA [facility]   1/24/2018 2:30 PM Carmelo Wolf MD [address] [facility]   1/25/2018 2:30 PM Nikki Mehta, PT [facility]   1/30/2018 3:00 PM Tawny Henry PTA [facility]   2/1/2018 3:00 PM Tawny Henry PTA [facility]   2/6/2018 3:00 PM Tawny Henry PTA [facility]   2/8/2018 3:00 PM Tawny Henry PTA [facility]

## 2018-01-19 ENCOUNTER — HOSPITAL ENCOUNTER (OUTPATIENT)
Dept: PHYSICAL THERAPY | Age: 48
Discharge: HOME | End: 2018-01-19
Payer: MEDICAID

## 2018-01-19 PROCEDURE — 97110 THERAPEUTIC EXERCISES: CPT

## 2018-01-19 NOTE — PROGRESS NOTES
PHYSICAL THERAPY - DAILY TREATMENT NOTE    Patient Name: Astrid James        Date: 2018  : 1970   YES Patient  Verified  Visit #:     Insurance: Payor: Tyler Yap / Plan: [facility] CCCP / Product Type: Managed Care Medicaid /      In time: 220 Out time: 3   Total Treatment Time: 40     Medicare Time Tracking (below)   Total Timed Codes (min):  40 1:1 Treatment Time:       TREATMENT AREA =  Low back pain [M54.5]    SUBJECTIVE  Pain Level (on 0 to 10 scale):  4  / 10   Medication Changes/New allergies or changes in medical history, any new surgeries or procedures? NO    If yes, update Summary List   Subjective Functional Status/Changes:  []  No changes reported     No new complaints. Pt states she did fine after the last session. OBJECTIVE      40 min Therapeutic Exercise:  [x]  See flow sheet   Rationale:      increase ROM, increase strength and improve coordination to improve the patients ability to perform pain free ADLs. min Patient Education:  YES  Reviewed HEP   []  Progressed/Changed HEP based on: Other Objective/Functional Measures: Therex per flow sheet. Post Treatment Pain Level (on 0 to 10) scale:   4  / 10     ASSESSMENT  Assessment/Changes in Function:     Increase in painful symptoms with bridges. Reduce repetitions with next visit. []  See Progress Note/Recertification   Patient will continue to benefit from skilled PT services to modify and progress therapeutic interventions, address functional mobility deficits, address ROM deficits, address strength deficits, analyze and address soft tissue restrictions, analyze and cue movement patterns, analyze and modify body mechanics/ergonomics and assess and modify postural abnormalities to attain remaining goals. Progress toward goals / Updated goals:    No change in progress toward LTG's with today's session.       PLAN  [x]  Upgrade activities as tolerated YES Continue plan of care   []  Discharge due to :    []  Other:      Therapist: Tawny Henry PTA    Date: 1/19/2018 Time: 2:25 PM     Future Appointments  Date Time Provider Jessica Reyez   1/19/2018 2:30 PM Tawny Henry PTA [facility]   1/23/2018 3:00 PM Tawny Henry PTA [facility]   1/24/2018 2:30 PM Carmelo Wolf MD [address] [facility]   1/25/2018 2:30 PM [address] [facility] ARLIN Mehta, BENITEZ [facility]   1/30/2018 3:00 PM Tawny Henry PTA [facility]   2/1/2018 3:00 PM Tawny Henry PTA [facility]   2/6/2018 3:00 PM Tawny Henry PTA [facility]   2/8/2018 3:00 PM Tawny Henry PTA [facility]

## 2018-01-23 ENCOUNTER — HOSPITAL ENCOUNTER (OUTPATIENT)
Dept: PHYSICAL THERAPY | Age: 48
Discharge: HOME | End: 2018-01-23
Payer: MEDICAID

## 2018-01-23 PROCEDURE — 97014 ELECTRIC STIMULATION THERAPY: CPT

## 2018-01-23 PROCEDURE — 97110 THERAPEUTIC EXERCISES: CPT

## 2018-01-23 NOTE — PROGRESS NOTES
PHYSICAL THERAPY - DAILY TREATMENT NOTE    Patient Name: Lindsay Aparicio        Date: 2018  : 1970   YES Patient  Verified  Visit #:   10   of   12  Insurance: Payor: Hugh Aguilar / Plan: [address] [facility] CCCP / Product Type: Managed Care Medicaid /      In time: 305 Out time: 355   Total Treatment Time: 50     Medicare Time Tracking (below)   Total Timed Codes (min):  50 1:1 Treatment Time:       TREATMENT AREA =  Low back pain [M54.5]    SUBJECTIVE  Pain Level (on 0 to 10 scale):  5  / 10   Medication Changes/New allergies or changes in medical history, any new surgeries or procedures? NO    If yes, update Summary List   Subjective Functional Status/Changes:  []  No changes reported     Recent max pain reported as 8/10. States she had an incident in the shower again today where she wasn't able to lift her (L) leg. States her back isn't hurting as bad, but it felt like her leg was locked, no pain. OBJECTIVE  Modalities Rationale:     decrease inflammation and decrease pain to improve patient's ability to perform pain free ADLs. 10 min [x] Estim, type/location: IFC (B) lumbar paraspinals.                                      []  att     [x]  unatt     []  w/US     [x]  w/ice    []  w/heat    min []  Mechanical Traction: type/lbs                   []  pro   []  sup   []  int   []  cont    []  before manual    []  after manual    min []  Ultrasound, settings/location:      min []  Iontophoresis w/ dexamethasone, location:                                               []  take home patch       []  in clinic    min []  Ice     []  Heat    location/position:     min []  Vasopneumatic Device, press/temp:     min []  Other:    [x] Skin assessment post-treatment (if applicable):    [x]  intact    []  redness- no adverse reaction     []redness  adverse reaction:        40 min Therapeutic Exercise:  [x]  See flow sheet   Rationale:      increase ROM, increase strength and improve coordination to improve the patients ability to perform pain free ADLs. min Patient Education:  YES  Reviewed HEP   []  Progressed/Changed HEP based on: Other Objective/Functional Measures: Therex per flow sheet. Post Treatment Pain Level (on 0 to 10) scale:   5  / 10     ASSESSMENT  Assessment/Changes in Function:     No exacerbation of symptoms with today's session. []  See Progress Note/Recertification   Patient will continue to benefit from skilled PT services to modify and progress therapeutic interventions, address functional mobility deficits, address ROM deficits, address strength deficits, analyze and address soft tissue restrictions, analyze and cue movement patterns, analyze and modify body mechanics/ergonomics and assess and modify postural abnormalities to attain remaining goals. Progress toward goals / Updated goals:    No change in progress toward LTG's with today's session. PLAN  [x]  Upgrade activities as tolerated YES Continue plan of care   []  Discharge due to :    []  Other:      Therapist: Zayra Lizarraga PTA    Date: 1/23/2018 Time: 3:18 PM     Future Appointments  Date Time Provider Jessica Reyez   1/24/2018 2:30 PM Rishi Morel MD [address] [facility]   1/25/2018 2:30 PM Nikki James, PT [facility]   1/30/2018 3:00 PM Zayra Lizarraga PTA [facility]   2/1/2018 3:00 PM Zayra Lizarraga PTA [facility]   2/6/2018 3:00 PM Zayra Lizarraga PTA [facility]   2/8/2018 3:00 PM Zayra Lizarraga PTA [facility]

## 2018-01-25 ENCOUNTER — HOSPITAL ENCOUNTER (OUTPATIENT)
Dept: PHYSICAL THERAPY | Age: 48
Discharge: HOME | End: 2018-01-25
Payer: MEDICAID

## 2018-01-25 PROCEDURE — 97140 MANUAL THERAPY 1/> REGIONS: CPT

## 2018-01-25 PROCEDURE — 97014 ELECTRIC STIMULATION THERAPY: CPT

## 2018-01-25 PROCEDURE — 97110 THERAPEUTIC EXERCISES: CPT

## 2018-01-25 NOTE — PROGRESS NOTES
PHYSICAL THERAPY - DAILY TREATMENT NOTE    Patient Name: Mariely Strauss        Date: 2018  : 1970   yes Patient  Verified  Visit #:   6   of   15-19  Insurance: Payor: Andrew Santos / [facility] / Product Type: Managed Care Medicaid /      In time: 230 Out time: 320   Total Treatment Time: 50     Medicare Time Tracking (below)   Total Timed Codes (min):  50 1:1 Treatment Time:  n/a     TREATMENT AREA =  Low back pain [M54.5]    SUBJECTIVE  Pain Level (on 0 to 10 scale):  7  / 10   Medication Changes/New allergies or changes in medical history, any new surgeries or procedures?    no  If yes, update Summary List   Subjective Functional Status/Changes:  []  No changes reported     \"I'm having trouble putting on my clothes. \" Constant Left leg pain. \"sometimes when I go to put on my sock/shoe I feel like I get locked in my hip\"          OBJECTIVE  Modalities Rationale:     decrease pain to improve patient's ability to perform functional mobility/ADLs and attain goals. 10  10 min [] Estim, type/location: IFC (B) lumbar paraspinals.                                      []  att     []  unatt     []  w/US     []  w/ice    []  w/heat    min []  Mechanical Traction: type/lbs                   []  pro   []  sup   []  int   []  cont    []  before manual    []  after manual    min []  Ultrasound, settings/location:      min []  Iontophoresis w/ dexamethasone, location:                                               []  take home patch       []  in clinic    min []  Ice     []  Heat    location/position:     min []  Vasopneumatic Device, press/temp:     min []  Other:    [] Skin assessment post-treatment (if applicable):    []  intact    []  redness- no adverse reaction     []redness  adverse reaction:        32 min Therapeutic Exercise:  [x]  See flow sheet   Rationale:      increase ROM and increase strength to improve the patients ability to perform functional mobility/ADLs and attain goals. 8 min Manual Therapy: P/A T9, T10.  DTM to lower thoracic paraspinals. Rationale:      decrease pain, increase ROM and increase tissue extensibility to improve patient's ability to perform functional mobility/ADLs and attain goals. min Patient Education:  yes  Reviewed HEP   []  Progressed/Changed HEP based on: Other Objective/Functional Measures:    -pain in to left lumbar SB and R Rotation; addressed with manual and positive cavitation and reduced pain/hypertonicity of paraspinals noted. Post Treatment Pain Level (on 0 to 10) scale:   5  / 10     ASSESSMENT  Assessment/Changes in Function:     See d/c summary     []  See Progress Note/Recertification      Progress toward goals / Updated goals:    See d/c summary     PLAN  []  Upgrade activities as tolerated no Continue plan of care   []  Discharge due to :    []  Other:      Therapist: Nallely Cole, PT    Date: 1/25/2018 Time: 12:25 PM     Future Appointments  Date Time Provider Jessica Reyez   1/25/2018 2:30 PM Nikki Cole, BENITEZ [facility]   1/30/2018 3:00 PM [facility]   2/1/2018 3:00 PM [facility], [facility]   2/6/2018 3:00 PM [facility]   2/8/2018 3:00 PM [facility], [facility]   2/15/2018 8:15 AM Werner Glass MD [address] [place] [address]

## 2018-01-25 NOTE — PROGRESS NOTES
[address] [facility] PHYSICAL THERAPY  [address] [facility], [address] [facility], [address] [facility] - Phone: [phone]  Fax: [phone]  DISCHARGE SUMMARY  Patient Name: Gabriela Cruz : 1970   Treatment/Medical Diagnosis: Low back pain [M54.5]   Referral Source: Rosa Short MD     Date of Initial Visit: 17 Attended Visits: 11 Missed Visits: 0     SUMMARY OF TREATMENT  PT has consisted of initial evaluation, therapeutic exercises, HEP, manual therapy, patient education, and modalities. CURRENT STATUS  Pt rates overall improvement as 0% with functional activities since [facility]. Current objective impairments:  Least pain= 4/10, max pain= 9/10, average pain= 5-8/10. Pt states she has constant numbness to LLE. Constant low back with occasional sharp, shooting pain. Pt also notes incontinence of urine, however she states this is chronic and currently followed by urologist.  Decreased strength: bridge to 25% with incr. Pain. Lumbar AROM: flex WNL, ext 75% (incr. P!), SB/ROT WNL with L SB and R ROT increasing c/o R lateral rib pain. MMT: Hip ext/abd= 4+ bilaterally. Current functional limitations:  Dressing, bathing, toileting, lifting, walking, standing      Goal/Measure of Progress Goal Met? 1. Pt will improve FOTO score to >/= 57 to demo a significant improvement in functional activity tolerance. Status at last Eval: 28 on 2018 Current Status: 42 no   2. Pt will achieve >/= +6 GROC in order to promote increased activity tolerance. Status at last Eval: -6 \"a great deal worse\" on 18 Current Status: 0 no   3. Pt will note pain less than or equal to 3/10 at worst to allow increase in functional abilities. Status at last Eval: 10 Current Status: 9/10 no     4. Pt will be (I) and compliant with long-term HEP in preparation for DC to HEP.    Status at last Eval:  Current Status: yes yes     RECOMMENDATIONS  Discontinue therapy due to lack of appreciable progress towards goals. Recommending pt return to MD for further appropriate medical intervention to address ongoing severe low back pain with positive radiculopathy. If you have any questions/comments please contact us directly at [phone]. Thank you for allowing us to assist in the care of your patient. Therapist Signature: Carlos Bateman.  Fred, PT Date: 01/25/18     Time: 2:41 PM

## 2018-01-30 ENCOUNTER — APPOINTMENT (OUTPATIENT)
Dept: PHYSICAL THERAPY | Age: 48
End: 2018-01-30
Payer: MEDICAID

## 2018-02-01 ENCOUNTER — APPOINTMENT (OUTPATIENT)
Dept: PHYSICAL THERAPY | Age: 48
End: 2018-02-01

## 2018-02-06 ENCOUNTER — APPOINTMENT (OUTPATIENT)
Dept: PHYSICAL THERAPY | Age: 48
End: 2018-02-06

## 2018-02-08 ENCOUNTER — APPOINTMENT (OUTPATIENT)
Dept: PHYSICAL THERAPY | Age: 48
End: 2018-02-08

## 2018-06-19 ENCOUNTER — OFFICE VISIT (OUTPATIENT)
Dept: FAMILY MEDICINE CLINIC | Age: 48
End: 2018-06-19

## 2018-06-19 VITALS
RESPIRATION RATE: 18 BRPM | SYSTOLIC BLOOD PRESSURE: 140 MMHG | BODY MASS INDEX: 42.51 KG/M2 | HEIGHT: 62 IN | OXYGEN SATURATION: 96 % | WEIGHT: 231 LBS | HEART RATE: 80 BPM | TEMPERATURE: 97.7 F | DIASTOLIC BLOOD PRESSURE: 84 MMHG

## 2018-06-19 DIAGNOSIS — R03.0 ELEVATED BLOOD PRESSURE READING: Primary | ICD-10-CM

## 2018-06-19 DIAGNOSIS — M79.7 FIBROMYALGIA: ICD-10-CM

## 2018-06-19 DIAGNOSIS — N32.81 OAB (OVERACTIVE BLADDER): ICD-10-CM

## 2018-06-19 DIAGNOSIS — M54.16 LUMBAR RADICULOPATHY: ICD-10-CM

## 2018-06-19 DIAGNOSIS — M51.34 BULGING OF THORACIC INTERVERTEBRAL DISC: ICD-10-CM

## 2018-06-19 DIAGNOSIS — D35.02 ADRENAL ADENOMA, LEFT: ICD-10-CM

## 2018-06-19 RX ORDER — QUETIAPINE FUMARATE 50 MG/1
50 TABLET, FILM COATED ORAL DAILY
COMMUNITY
End: 2019-05-09 | Stop reason: ALTCHOICE

## 2018-06-19 RX ORDER — SOLIFENACIN SUCCINATE 10 MG/1
10 TABLET, FILM COATED ORAL DAILY
Qty: 90 TAB | Refills: 0 | Status: SHIPPED | OUTPATIENT
Start: 2018-06-19 | End: 2019-05-09 | Stop reason: ALTCHOICE

## 2018-06-19 RX ORDER — SPIRONOLACTONE 25 MG/1
25 TABLET ORAL DAILY
Qty: 90 TAB | Refills: 0 | Status: SHIPPED | OUTPATIENT
Start: 2018-06-19 | End: 2019-05-09 | Stop reason: ALTCHOICE

## 2018-06-19 RX ORDER — SERTRALINE HYDROCHLORIDE 50 MG/1
TABLET, FILM COATED ORAL DAILY
COMMUNITY
End: 2019-05-09 | Stop reason: ALTCHOICE

## 2018-06-19 NOTE — MR AVS SNAPSHOT
[address] [place] [address] [address] [facility] [id number] [phone] Patient: Carlos Eduardo Tarango MRN: [id number] :1970 Visit Information Date & Time Provider Department Dept. Phone Encounter #  
 2018  1:15 PM Jordyn Ho MD [address] [place] [address] [phone] [id number]  
  
 10/1/2018  2:00 PM  
Any with Ivania Man MD  
[facility] [address]) Appt Note: Return in about 6 months (around 2018). [address] [address] [phone]  
  
   
 [place] [id number] Upcoming Health Maintenance Date Due Pneumococcal 19-64 Medium Risk (1 of 1 - PPSV23) 10/20/1987 DTaP/Tdap/Td series (1 - Tdap) 10/20/1989 Influenza Age 5 to Adult 2018 PAP AKA CERVICAL CYTOLOGY 2020 Allergies as of 2018  Review Complete On: 2018 By: Jordyn Ho MD  
  
 Severity Noted Reaction Type Reactions Codeine High 2017    Anaphylaxis Pcn [Penicillins]  2017    Rash Current Immunizations  Never Reviewed Name Date Influenza Vaccine (Quad) PF 10/24/2017  8:44 AM  
  
 Not reviewed this visit You Were Diagnosed With   
  
 Codes Comments Elevated blood pressure reading    -  Primary ICD-10-CM: R03.0 ICD-9-CM: 796.2 Adrenal adenoma, left     ICD-10-CM: D35.02 
ICD-9-CM: 227.0   
 OAB (overactive bladder)     ICD-10-CM: N32.81 ICD-9-CM: 596.51 Fibromyalgia     ICD-10-CM: M79.7 ICD-9-CM: 729.1 Lumbar radiculopathy     ICD-10-CM: M54.16 
ICD-9-CM: 724.4 Vitals BP Pulse Temp Resp Height(growth percentile) Weight(growth percentile) 140/84 (BP 1 Location: Left arm, BP Patient Position: Sitting) 80 97.7 °F (36.5 °C) (Oral) 18 5' 2\" (1.575 m) 231 lb (104.8 kg) SpO2 BMI OB Status Smoking Status 96% 42.25 kg/m2 Postmenopausal Current Every Day Smoker Vitals History BMI and BSA Data Body Mass Index Body Surface Area  
 42.25 kg/m 2 2.14 m 2 Preferred Pharmacy Pharmacy Name Phone [address] [phone] Your Updated Medication List  
  
   
This list is accurate as of 6/19/18  1:27 PM.  Always use your most recent med list.  
  
  
  
  
 SEROquel 50 mg tablet Generic drug:  QUEtiapine Take 50 mg by mouth daily. solifenacin 10 mg tablet Commonly known as:  Thedora Bloch Take 1 Tab by mouth daily. spironolactone 25 mg tablet Commonly known as:  ALDACTONE Take 1 Tab by mouth daily. ZOLOFT 50 mg tablet Generic drug:  sertraline Take  by mouth daily. Prescriptions Sent to Pharmacy Refills  
 solifenacin (VESICARE) 10 mg tablet 0 Sig: Take 1 Tab by mouth daily. Class: Normal  
 Pharmacy: [facility] [address] AL[address] HUGO [address], [place] Ph #: [phone] Route: Oral  
 spironolactone (ALDACTONE) 25 mg tablet 0 Sig: Take 1 Tab by mouth daily. Class: Normal  
 Pharmacy: [facility] [address] ALTON HUGO [address], [place] Ph #: [phone] Route: Oral  
  
To-Do List   
 06/19/2018 Imaging:  MRI LUMB SPINE WO CONT Introducing [facility]! Dear Chuy : 
Thank you for requesting a ZIIBRA account. Our records indicate that you already have an active ZIIBRA account. You can access your account anytime at [email or web address] Did you know that you can access your hospital and ER discharge instructions at any time in ZIIBRA? You can also review all of your test results from your hospital stay or ER visit. Additional Information If you have questions, please visit the Frequently Asked Questions section of the ZIIBRA website at [email or web address] Remember, ZIIBRA is NOT to be used for urgent needs.  For medical emergencies, dial 911. Now available from your iPhone and Android! Please provide this summary of care documentation to your next provider. Your primary care clinician is listed as Eliot Graves. If you have any questions after today's visit, please call [phone].

## 2018-06-19 NOTE — PROGRESS NOTES
Ernesto Lauren is a 50 y.o. female (: 1970) presenting to address:    Chief Complaint   Patient presents with    Hypertension    Back Pain       Vitals:    18 1304   BP: 140/84   Pulse: 80   Resp: 18   Temp: 97.7 °F (36.5 °C)   TempSrc: Oral   SpO2: 96%   Weight: 231 lb (104.8 kg)   Height: 5' 2\" (1.575 m)   PainSc:   4   PainLoc: Back       Hearing/Vision:   No exam data present    Learning Assessment:   No flowsheet data found. Depression Screening:     PHQ over the last two weeks 2018   PHQ Not Done Active Diagnosis of Depression or Bipolar Disorder   Little interest or pleasure in doing things -   Feeling down, depressed or hopeless -   Total Score PHQ 2 -   Trouble falling or staying asleep, or sleeping too much -   Feeling tired or having little energy -   Poor appetite or overeating -   Feeling bad about yourself - or that you are a failure or have let yourself or your family down -   Trouble concentrating on things such as school, work, reading or watching TV -   Moving or speaking so slowly that other people could have noticed; or the opposite being so fidgety that others notice -   Thoughts of being better off dead, or hurting yourself in some way -   PHQ 9 Score -     Fall Risk Assessment:     Fall Risk Assessment, last 12 mths 2018   Able to walk? Yes   Fall in past 12 months? Yes   Fall with injury? No   Number of falls in past 12 months 1   Fall Risk Score 1     Abuse Screening:     Abuse Screening Questionnaire 2018   Do you ever feel afraid of your partner? N   Are you in a relationship with someone who physically or mentally threatens you? N   Is it safe for you to go home? Y     Coordination of Care Questionaire:   1. Have you been to the ER, urgent care clinic since your last visit? Hospitalized since your last visit? NO    2. Have you seen or consulted any other health care providers outside of the [address] [place] [address] since your last visit?   Include any pap smears or colon screening. NO    Advanced Directive:   1. Do you have an Advanced Directive? NO    2. Would you like information on Advanced Directives?  NO

## 2018-06-19 NOTE — PROGRESS NOTES
Assessment/Plan:    1. Elevated blood pressure reading and adrenal adenoma  - spironolactone (ALDACTONE) 25 mg tablet; Take 1 Tab by mouth daily. Dispense: 90 Tab; Refill: 0    2. OAB (overactive bladder)  -can't afford myrbetriq. Trial vesicare. - solifenacin (VESICARE) 10 mg tablet; Take 1 Tab by mouth daily. Dispense: 90 Tab; Refill: 0    3. Fibromyalgia  -failed PT    4. Lumbar radiculopathy  -failed PT.  NCS showed L5-S1 left lumbar radiculopathy. Ck MRI  - MRI LUMB SPINE WO CONT; Future    The plan was discussed with the patient. The patient verbalized understanding and is in agreement with the plan. All medication potential side effects were discussed with the patient. Health Maintenance:   Health Maintenance   Topic Date Due    Pneumococcal 19-64 Medium Risk (1 of 1 - PPSV23) 10/20/1987    DTaP/Tdap/Td series (1 - Tdap) 10/20/1989    Influenza Age 9 to Adult  08/01/2018    PAP AKA CERVICAL CYTOLOGY  11/06/2020       Bonifacio Burns is a 50 y.o. female and presents with Hypertension and Back Pain     Subjective:  Elevated bp - bp had been better. She's lost some wt. Does eat a lot of canned goods. Was found to have adrenal adenoma by urology. OAB - can't afford myrbetriq. Wants to try vesicare. Fibromyalgia  And lumbar radiculopathy- was referred to PT, but didn't feel this helped. NCS at Dr. Marybel Orellana showed L5-S1 lumbar radiculopathy. She c/o back pain with radiation down L leg and numbness. ROS:  Constitutional: No recent weight change. No weakness/fatigue. No f/c. Cardiovascular: No CP/palpitations. No LYLES/orthopnea/PND. Respiratory: No cough/sputum, dyspnea, wheezing. Gastointestinal: No dysphagia, reflux. No n/v. No constipation/diarrhea. No melena/rectal bleeding. Genitourinary: No dysuria, urinary hesitancy, nocturia, hematuria. No incontinence. Musculoskeletal: No joint pain/stiffness. + muscle pain/tenderness.    Neurological: No seizures/numbness/weakness. + paresthesias. The problem list was updated as a part of today's visit. Patient Active Problem List   Diagnosis Code    Tobacco use Z72.0    Radicular low back pain M54.10    Prediabetes R73.03    Low HDL (under 40) E78.6    Mixed hypercholesterolemia and hypertriglyceridemia E78.2    Fibromyalgia M79.7    Ganglion of wrist M67.439    Moderate single current episode of major depressive disorder (HCC) F32.1    Obesity, morbid (HCC) E66.01    Elevated blood pressure reading R03.0       The PSH, FH were reviewed. SH:  Social History   Substance Use Topics    Smoking status: Current Every Day Smoker    Smokeless tobacco: Never Used    Alcohol use Yes       Medications/Allergies:    Current Outpatient Prescriptions:     sertraline (ZOLOFT) 50 mg tablet, Take  by mouth daily. , Disp: , Rfl:     QUEtiapine (SEROQUEL) 50 mg tablet, Take 50 mg by mouth daily. , Disp: , Rfl:     solifenacin (VESICARE) 10 mg tablet, Take 1 Tab by mouth daily. , Disp: 90 Tab, Rfl: 0       Allergies   Allergen Reactions    Codeine Anaphylaxis    Pcn [Penicillins] Rash       Objective:  Visit Vitals    /84 (BP 1 Location: Left arm, BP Patient Position: Sitting)    Pulse 80    Temp 97.7 °F (36.5 °C) (Oral)    Resp 18    Ht 5' 2\" (1.575 m)    Wt 231 lb (104.8 kg)    SpO2 96%    BMI 42.25 kg/m2      Constitutional: Well developed, nourished, no distress, alert, obese habitus   CV: S1, S2.  RRR. No murmurs/rubs. No thrills palpated. No carotid bruits. Intact distal pulses. No edema. Pulm: No abnormalities on inspection. Clear to auscultation bilaterally. No wheezing/rhonchi. Normal effort. Psych: Appropriate affect, judgement and insight. Short-term memory intact.

## 2018-07-03 ENCOUNTER — TELEPHONE (OUTPATIENT)
Dept: FAMILY MEDICINE CLINIC | Age: 48
End: 2018-07-03

## 2018-07-03 NOTE — TELEPHONE ENCOUNTER
Pt called concerned over no result from MRI. MRI reviewed by covering Dr. Jeffrey Valencia in pcp absence. Relayed to pt per  lumbar regions have degenerative disc disease and likely recommendation for MRI thoracic to eval for disc issue. Pt noted that she feels the pain in low thoracic region mostly and contemplates going to the ED today because she feels \"slumped over\".

## 2018-07-09 NOTE — TELEPHONE ENCOUNTER
Call pt. Did she go to ER? Does she want to do thoracic MRI? The MRI of the back didn't show the suspected problem (based on the nerve conduction study results). There was no pinched nerves in her low back.

## 2018-07-10 ENCOUNTER — TELEPHONE (OUTPATIENT)
Dept: FAMILY MEDICINE CLINIC | Age: 48
End: 2018-07-10

## 2018-07-10 NOTE — TELEPHONE ENCOUNTER
Pt called because she was not sure if she needed us to order a new mri order. She just had a mri of her spine done at [address] [place] [address] on [address] and they wanted to check lower in her spine.  Please advise

## 2018-07-11 DIAGNOSIS — M51.34 DDD (DEGENERATIVE DISC DISEASE), THORACIC: Primary | ICD-10-CM

## 2018-07-11 DIAGNOSIS — M51.24 HERNIATED THORACIC DISC WITHOUT MYELOPATHY: ICD-10-CM

## 2018-08-08 DIAGNOSIS — M54.16 LUMBAR RADICULOPATHY: ICD-10-CM

## 2018-08-23 DIAGNOSIS — M51.24 HERNIATED THORACIC DISC WITHOUT MYELOPATHY: ICD-10-CM

## 2018-08-23 DIAGNOSIS — M51.34 DDD (DEGENERATIVE DISC DISEASE), THORACIC: ICD-10-CM

## 2019-05-09 ENCOUNTER — OFFICE VISIT (OUTPATIENT)
Dept: FAMILY MEDICINE CLINIC | Age: 49
End: 2019-05-09

## 2019-05-09 VITALS
SYSTOLIC BLOOD PRESSURE: 120 MMHG | TEMPERATURE: 98.7 F | RESPIRATION RATE: 20 BRPM | WEIGHT: 237 LBS | HEART RATE: 83 BPM | BODY MASS INDEX: 43.61 KG/M2 | HEIGHT: 62 IN | OXYGEN SATURATION: 95 % | DIASTOLIC BLOOD PRESSURE: 90 MMHG

## 2019-05-09 DIAGNOSIS — R56.9 SEIZURE-LIKE ACTIVITY (HCC): Primary | ICD-10-CM

## 2019-05-09 RX ORDER — ONDANSETRON 4 MG/1
4 TABLET, ORALLY DISINTEGRATING ORAL
COMMUNITY
Start: 2018-02-23

## 2019-05-09 NOTE — PATIENT INSTRUCTIONS

## 2019-05-09 NOTE — PROGRESS NOTES
Ravinder Winters is a 46 y.o. female (: 1970) presenting to address: Chief Complaint Patient presents with  Seizure Vitals:  
 19 1429 BP: 120/90 Pulse: 83 Resp: 20 Temp: 98.7 °F (37.1 °C) TempSrc: Oral  
SpO2: 95% Weight: 237 lb (107.5 kg) Height: 5' 2\" (1.575 m) PainSc:   4 PainLoc: Head Learning Assessment:  
 
Learning Assessment 2019 PRIMARY LEARNER Patient HIGHEST LEVEL OF EDUCATION - PRIMARY LEARNER  SOME COLLEGE  
BARRIERS PRIMARY LEARNER NONE  
CO-LEARNER CAREGIVER No  
PRIMARY LANGUAGE ENGLISH  NEED No  
LEARNER PREFERENCE PRIMARY LISTENING  
LEARNING SPECIAL TOPICS none ANSWERED BY patient RELATIONSHIP SELF  
ASSESSMENT COMMENT na  
 
Depression Screening:  
 
3 most recent PHQ Screens 2019 PHQ Not Done Active Diagnosis of Depression or Bipolar Disorder Little interest or pleasure in doing things - Feeling down, depressed, irritable, or hopeless - Total Score PHQ 2 - Trouble falling or staying asleep, or sleeping too much - Feeling tired or having little energy - Poor appetite, weight loss, or overeating - Feeling bad about yourself - or that you are a failure or have let yourself or your family down - Trouble concentrating on things such as school, work, reading, or watching TV - Moving or speaking so slowly that other people could have noticed; or the opposite being so fidgety that others notice - Thoughts of being better off dead, or hurting yourself in some way -  
PHQ 9 Score - Fall Risk Assessment:  
 
Fall Risk Assessment, last 12 mths 2019 Able to walk? Yes Fall in past 12 months? No  
Fall with injury? -  
Number of falls in past 12 months - Fall Risk Score -  
 
Abuse Screening:  
 
Abuse Screening Questionnaire 2019 Do you ever feel afraid of your partner? Diane Mckeon Are you in a relationship with someone who physically or mentally threatens you?  Diane Mckeon  
 Is it safe for you to go home? Lela Berman Coordination of Care Questionaire: 1. Have you been to the ER, urgent care clinic since your last visit? Hospitalized since your last visit? YES jordin steven 2. Have you seen or consulted any other health care providers outside of the [address] [place] [address] since your last visit? Include any pap smears or colon screening. NO Advanced Directive: 1. Do you have an Advanced Directive? NO 
 
2. Would you like information on Advanced Directives?  YES

## 2019-05-09 NOTE — PROGRESS NOTES
Assessment/Plan: 1. Seizure-like activity ([facility] [address] 
-has appt 5/30. Needs eeg. Will ck CT. No driving until cleared by neuro. - REFERRAL TO NEUROLOGY 
- CT HEAD WO CONT; Future The plan was discussed with the patient. The patient verbalized understanding and is in agreement with the plan. All medication potential side effects were discussed with the patient. Health Maintenance:  
Health Maintenance Topic Date Due  Pneumococcal 0-64 years (1 of 1 - PPSV23) 10/20/1974  
 DTaP/Tdap/Td series (1 - Tdap) 10/20/1989  Shingrix Vaccine Age 50> (1 of 2) 10/20/2018  BREAST CANCER SCRN MAMMOGRAM  10/20/2018  FOBT Q 1 YEAR AGE 50-75  10/20/2018  Influenza Age 5 to Adult  08/01/2019  PAP AKA CERVICAL CYTOLOGY  11/06/2020 Darya Blackmon is a 46 y.o. female and presents with Seizure Subjective: 
Pt states she had a 'seizure' 6 days ago. She had drunk 4 beers prior (although BAL was undetectable in ER). States she got hot inside, she vomited and then leaned back. Her friend witnessed this and stated she had black vomit, her head rolled back in her head and she had \"convulsions\"  Of arms and legs and foaming at her mouth. She had urinary incontinence during the episode. She reports a h/o \"petit mal\" seizures as a child. No h/o seizure as an adult. 911 was called but pt refused to go to hospital.  She went to ER. Labs reviewed from ER were significant for leukocytosis. She had a prodrome prior to seizure with HA, fatigue and nausea. She has an appt with Dr. Patiño Press 5/30/19. Since her seizure, she c/o head pressure along maxillary region and frontal region, worse when she bends forward. CT head 2/2018 was nml. She also notes some confusion after the seizure - states today she put 2901 instead of 2019 for date. ROS: 
Constitutional: No recent weight change. No weakness/fatigue. No f/c. Cardiovascular: No CP/palpitations. No LYLES/orthopnea/PND. Respiratory: No cough/sputum, dyspnea, wheezing. Gastointestinal: No dysphagia, reflux. No n/v. No constipation/diarrhea. No melena/rectal bleeding. Neurological: + seizures/no numbness/weakness. No paresthesias. The problem list was updated as a part of today's visit. Patient Active Problem List  
Diagnosis Code  Tobacco use Z72.0  Radicular low back pain M54.10  Prediabetes R73.03  
 Low HDL (under 40) E78.6  Mixed hypercholesterolemia and hypertriglyceridemia E78.2  Fibromyalgia M79.7  Ganglion of wrist [id number]  Moderate single current episode of major depressive disorder (HCC) F32.1  Obesity, morbid ([address] E66.01  
 Elevated blood pressure reading R03.0  Adrenal adenoma, left D35.02 The PSH, FH were reviewed. SH: Social History Tobacco Use  Smoking status: Current Every Day Smoker  Smokeless tobacco: Never Used Substance Use Topics  Alcohol use: Yes  Drug use: Yes Types: Marijuana Medications/Allergies: 
Current Outpatient Medications on File Prior to Visit Medication Sig Dispense Refill  ondansetron (ZOFRAN ODT) 4 mg disintegrating tablet Take 4 mg by mouth. No current facility-administered medications on file prior to visit. Allergies Allergen Reactions  Codeine Anaphylaxis  Pcn [Penicillins] Rash Objective: 
Visit Vitals /90 (BP 1 Location: Left arm, BP Patient Position: Sitting) Pulse 83 Temp 98.7 °F (37.1 °C) (Oral) Resp 20 Ht 5' 2\" (1.575 m) Wt 237 lb (107.5 kg) SpO2 95% BMI 43.35 kg/m² Constitutional: Well developed, nourished, no distress, alert, obese habitus HENT: Exterior ears and tympanic membranes normal bilaterally. Supple neck. No thyromegaly or lymphadenopathy. Oropharynx clear and moist mucous membranes. Normal inferior turbinates. Septum midline. +bilat middle ear effusion Eyes: Conjunctiva normal. PERRL.   Eyelids normal.  
 CV: S1, S2.  RRR. No murmurs/rubs. Pulm: No abnormalities on inspection. Clear to auscultation bilaterally. No wheezing/rhonchi. Normal effort. Neuro: A/O x 3. No focal motor or sensory deficits. Speech normal.  
Psych: Appropriate affect, judgement and insight. Short-term memory intact.

## 2019-05-23 DIAGNOSIS — R56.9 SEIZURE-LIKE ACTIVITY (HCC): ICD-10-CM

## 2022-03-18 PROBLEM — E78.6 LOW HDL (UNDER 40): Status: ACTIVE | Noted: 2017-06-07

## 2022-03-18 PROBLEM — E66.01 OBESITY, MORBID (HCC): Status: ACTIVE | Noted: 2017-11-28

## 2022-03-18 PROBLEM — Z72.0 TOBACCO USE: Status: ACTIVE | Noted: 2017-05-25

## 2022-03-19 PROBLEM — D35.02 ADRENAL ADENOMA, LEFT: Status: ACTIVE | Noted: 2018-06-19

## 2022-03-19 PROBLEM — F32.1 MODERATE SINGLE CURRENT EPISODE OF MAJOR DEPRESSIVE DISORDER (HCC): Status: ACTIVE | Noted: 2017-10-24

## 2022-03-19 PROBLEM — M79.7 FIBROMYALGIA: Status: ACTIVE | Noted: 2017-08-15

## 2022-03-19 PROBLEM — M67.439 GANGLION OF WRIST: Status: ACTIVE | Noted: 2017-10-24

## 2022-03-19 PROBLEM — R73.03 PREDIABETES: Status: ACTIVE | Noted: 2017-06-07

## 2022-03-19 PROBLEM — E78.2 MIXED HYPERCHOLESTEROLEMIA AND HYPERTRIGLYCERIDEMIA: Status: ACTIVE | Noted: 2017-06-07

## 2022-03-19 PROBLEM — R03.0 ELEVATED BLOOD PRESSURE READING: Status: ACTIVE | Noted: 2017-11-28

## 2022-03-19 PROBLEM — M54.10 RADICULAR LOW BACK PAIN: Status: ACTIVE | Noted: 2017-05-25

## 2023-05-17 RX ORDER — ONDANSETRON 4 MG/1
4 TABLET, ORALLY DISINTEGRATING ORAL
COMMUNITY
Start: 2018-02-23